# Patient Record
Sex: MALE | Race: WHITE | Employment: UNEMPLOYED | URBAN - METROPOLITAN AREA
[De-identification: names, ages, dates, MRNs, and addresses within clinical notes are randomized per-mention and may not be internally consistent; named-entity substitution may affect disease eponyms.]

---

## 2017-01-10 ENCOUNTER — HOSPITAL ENCOUNTER (OUTPATIENT)
Dept: CT IMAGING | Age: 52
Discharge: HOME | End: 2017-01-10
Attending: INTERNAL MEDICINE | Admitting: INTERNAL MEDICINE

## 2017-01-10 DIAGNOSIS — C34.31 MALIGNANT NEOPLASM OF LOWER LOBE OF RIGHT LUNG (HCC): ICD-10-CM

## 2017-01-10 DIAGNOSIS — R59.0 MEDIASTINAL ADENOPATHY: ICD-10-CM

## 2017-01-29 PROBLEM — Z85.118 ENCOUNTER FOR FOLLOW-UP SURVEILLANCE OF LUNG CANCER: Status: ACTIVE | Noted: 2017-01-29

## 2017-01-29 PROBLEM — Z08 ENCOUNTER FOR FOLLOW-UP SURVEILLANCE OF LUNG CANCER: Status: ACTIVE | Noted: 2017-01-29

## 2017-01-29 PROBLEM — Z08 ENCOUNTER FOR FOLLOW-UP EXAMINATION AFTER COMPLETED TREATMENT FOR MALIGNANT NEOPLASM: Status: ACTIVE | Noted: 2017-01-29

## 2017-01-30 RX ORDER — TIOTROPIUM BROMIDE AND OLODATEROL 3.124; 2.736 UG/1; UG/1
SPRAY, METERED RESPIRATORY (INHALATION)
Qty: 1 INHALER | Refills: 2 | Status: SHIPPED | OUTPATIENT
Start: 2017-01-30 | End: 2017-04-29 | Stop reason: SDUPTHER

## 2017-04-25 ENCOUNTER — HOSPITAL ENCOUNTER (OUTPATIENT)
Dept: CT IMAGING | Age: 52
Discharge: HOME | End: 2017-04-25
Attending: INTERNAL MEDICINE | Admitting: INTERNAL MEDICINE

## 2017-04-25 DIAGNOSIS — Z08 ENCOUNTER FOR FOLLOW-UP EXAMINATION AFTER COMPLETED TREATMENT FOR MALIGNANT NEOPLASM: ICD-10-CM

## 2017-04-25 DIAGNOSIS — Z85.118 ENCOUNTER FOR FOLLOW-UP SURVEILLANCE OF LUNG CANCER: ICD-10-CM

## 2017-04-25 DIAGNOSIS — Z85.118 PERSONAL HISTORY OF LUNG CANCER: ICD-10-CM

## 2017-04-25 DIAGNOSIS — Z85.118 PERSONAL HISTORY OF OTHER MALIGNANT NEOPLASM OF BRONCHUS AND LUNG: ICD-10-CM

## 2017-04-25 DIAGNOSIS — Z08 ENCOUNTER FOR FOLLOW-UP SURVEILLANCE OF LUNG CANCER: ICD-10-CM

## 2017-05-01 RX ORDER — TIOTROPIUM BROMIDE AND OLODATEROL 3.124; 2.736 UG/1; UG/1
SPRAY, METERED RESPIRATORY (INHALATION)
Qty: 1 INHALER | Refills: 3 | Status: SHIPPED | OUTPATIENT
Start: 2017-05-01 | End: 2017-09-19 | Stop reason: SDUPTHER

## 2017-08-16 ENCOUNTER — OFFICE VISIT (OUTPATIENT)
Dept: PRIMARY CARE CLINIC | Age: 52
End: 2017-08-16

## 2017-08-16 VITALS
DIASTOLIC BLOOD PRESSURE: 70 MMHG | TEMPERATURE: 97.6 F | SYSTOLIC BLOOD PRESSURE: 120 MMHG | HEART RATE: 120 BPM | BODY MASS INDEX: 21.5 KG/M2 | WEIGHT: 137 LBS | HEIGHT: 67 IN | OXYGEN SATURATION: 98 %

## 2017-08-16 DIAGNOSIS — Z11.59 NEED FOR HEPATITIS C SCREENING TEST: ICD-10-CM

## 2017-08-16 DIAGNOSIS — K40.90 RIGHT INGUINAL HERNIA: Primary | ICD-10-CM

## 2017-08-16 DIAGNOSIS — Z13.220 SCREENING FOR HYPERLIPIDEMIA: ICD-10-CM

## 2017-08-16 DIAGNOSIS — Z11.4 SCREENING FOR HIV WITHOUT PRESENCE OF RISK FACTORS: ICD-10-CM

## 2017-08-16 LAB
CHOLESTEROL, TOTAL: 191 MG/DL (ref 0–199)
HDLC SERPL-MCNC: 53 MG/DL (ref 40–60)
LDL CHOLESTEROL CALCULATED: 115 MG/DL
TRIGL SERPL-MCNC: 113 MG/DL (ref 0–150)
VLDLC SERPL CALC-MCNC: 23 MG/DL

## 2017-08-16 PROCEDURE — 36415 COLL VENOUS BLD VENIPUNCTURE: CPT | Performed by: NURSE PRACTITIONER

## 2017-08-16 PROCEDURE — 99202 OFFICE O/P NEW SF 15 MIN: CPT | Performed by: NURSE PRACTITIONER

## 2017-08-16 RX ORDER — QUETIAPINE FUMARATE 100 MG/1
200 TABLET, FILM COATED ORAL NIGHTLY
COMMUNITY
Start: 2017-08-15

## 2017-08-16 ASSESSMENT — ENCOUNTER SYMPTOMS
HEARTBURN: 0
HEMOPTYSIS: 0
VOMITING: 0
DIARRHEA: 0
SPUTUM PRODUCTION: 0
CONSTIPATION: 0
SHORTNESS OF BREATH: 1
BACK PAIN: 0
COUGH: 1
NAUSEA: 0
ABDOMINAL PAIN: 0
BLOOD IN STOOL: 0

## 2017-08-17 LAB
HEPATITIS C ANTIBODY INTERPRETATION: REACTIVE
HIV-1 AND HIV-2 ANTIBODIES: NORMAL

## 2017-08-22 DIAGNOSIS — R76.8 HEPATITIS C ANTIBODY TEST POSITIVE: Primary | ICD-10-CM

## 2017-08-30 ENCOUNTER — INITIAL CONSULT (OUTPATIENT)
Dept: SURGERY | Age: 52
End: 2017-08-30

## 2017-08-30 VITALS
WEIGHT: 138 LBS | SYSTOLIC BLOOD PRESSURE: 112 MMHG | HEART RATE: 83 BPM | HEIGHT: 67 IN | BODY MASS INDEX: 21.66 KG/M2 | DIASTOLIC BLOOD PRESSURE: 73 MMHG

## 2017-08-30 DIAGNOSIS — K40.90 INGUINAL HERNIA OF RIGHT SIDE WITHOUT OBSTRUCTION OR GANGRENE: Primary | ICD-10-CM

## 2017-08-30 DIAGNOSIS — Z72.0 TOBACCO USE: ICD-10-CM

## 2017-08-30 PROCEDURE — 99243 OFF/OP CNSLTJ NEW/EST LOW 30: CPT | Performed by: SURGERY

## 2017-08-30 ASSESSMENT — ENCOUNTER SYMPTOMS
SHORTNESS OF BREATH: 0
GASTROINTESTINAL NEGATIVE: 1
COUGH: 1
VOMITING: 0
NAUSEA: 0
WHEEZING: 0

## 2017-08-31 ENCOUNTER — TELEPHONE (OUTPATIENT)
Dept: PULMONOLOGY | Age: 52
End: 2017-08-31

## 2017-08-31 ENCOUNTER — HOSPITAL ENCOUNTER (OUTPATIENT)
Dept: NUCLEAR MEDICINE | Age: 52
Discharge: HOME | End: 2017-08-31
Admitting: INTERNAL MEDICINE

## 2017-08-31 DIAGNOSIS — M25.551 RIGHT HIP PAIN: ICD-10-CM

## 2017-08-31 DIAGNOSIS — C34.31 MALIGNANT NEOPLASM OF LOWER LOBE OF RIGHT LUNG (HCC): ICD-10-CM

## 2017-08-31 DIAGNOSIS — C34.31 MALIGNANT NEOPLASM OF LOWER LOBE, RIGHT BRONCHUS OR LUNG (HCC): ICD-10-CM

## 2017-08-31 RX ORDER — TC 99M MEDRONATE 20 MG/10ML
25 INJECTION, POWDER, LYOPHILIZED, FOR SOLUTION INTRAVENOUS
Status: COMPLETED | OUTPATIENT
Start: 2017-08-31 | End: 2017-08-31

## 2017-08-31 RX ORDER — ALBUTEROL SULFATE 90 UG/1
1 AEROSOL, METERED RESPIRATORY (INHALATION) EVERY 4 HOURS PRN
Qty: 1 INHALER | Refills: 0 | Status: SHIPPED | OUTPATIENT
Start: 2017-08-31 | End: 2017-09-11 | Stop reason: SDUPTHER

## 2017-08-31 RX ADMIN — TC 99M MEDRONATE 25 MILLICURIE: 20 INJECTION, POWDER, LYOPHILIZED, FOR SOLUTION INTRAVENOUS at 08:14

## 2017-09-12 RX ORDER — TIOTROPIUM BROMIDE AND OLODATEROL 3.124; 2.736 UG/1; UG/1
SPRAY, METERED RESPIRATORY (INHALATION)
Qty: 1 INHALER | Refills: 0 | Status: SHIPPED | OUTPATIENT
Start: 2017-09-12 | End: 2017-09-19 | Stop reason: SDUPTHER

## 2017-09-14 ENCOUNTER — TELEPHONE (OUTPATIENT)
Dept: SURGERY | Age: 52
End: 2017-09-14

## 2017-09-18 ENCOUNTER — TELEPHONE (OUTPATIENT)
Dept: SURGERY | Age: 52
End: 2017-09-18

## 2017-09-19 ENCOUNTER — PAT TELEPHONE (OUTPATIENT)
Dept: PREADMISSION TESTING | Age: 52
End: 2017-09-19

## 2017-09-19 ENCOUNTER — OFFICE VISIT (OUTPATIENT)
Dept: PULMONOLOGY | Age: 52
End: 2017-09-19

## 2017-09-19 VITALS
DIASTOLIC BLOOD PRESSURE: 68 MMHG | RESPIRATION RATE: 20 BRPM | HEIGHT: 67 IN | SYSTOLIC BLOOD PRESSURE: 104 MMHG | HEART RATE: 69 BPM | BODY MASS INDEX: 22.16 KG/M2 | OXYGEN SATURATION: 97 % | TEMPERATURE: 98.2 F | WEIGHT: 141.2 LBS

## 2017-09-19 VITALS — BODY MASS INDEX: 22.66 KG/M2 | HEIGHT: 66 IN | WEIGHT: 141 LBS

## 2017-09-19 DIAGNOSIS — Z23 NEED FOR INFLUENZA VACCINATION: ICD-10-CM

## 2017-09-19 DIAGNOSIS — Z72.0 TOBACCO ABUSE: ICD-10-CM

## 2017-09-19 DIAGNOSIS — J43.1 PANLOBULAR EMPHYSEMA (HCC): Primary | ICD-10-CM

## 2017-09-19 DIAGNOSIS — C34.31 MALIGNANT NEOPLASM OF LOWER LOBE OF RIGHT LUNG (HCC): ICD-10-CM

## 2017-09-19 PROCEDURE — 90688 IIV4 VACCINE SPLT 0.5 ML IM: CPT | Performed by: NURSE PRACTITIONER

## 2017-09-19 PROCEDURE — 99214 OFFICE O/P EST MOD 30 MIN: CPT | Performed by: NURSE PRACTITIONER

## 2017-09-19 PROCEDURE — 90471 IMMUNIZATION ADMIN: CPT | Performed by: NURSE PRACTITIONER

## 2017-09-22 ENCOUNTER — HOSPITAL ENCOUNTER (OUTPATIENT)
Dept: SURGERY | Age: 52
Discharge: HOME | End: 2017-09-22
Attending: SURGERY | Admitting: SURGERY

## 2017-09-22 VITALS
TEMPERATURE: 97.6 F | RESPIRATION RATE: 16 BRPM | HEART RATE: 76 BPM | HEIGHT: 66 IN | WEIGHT: 138.67 LBS | BODY MASS INDEX: 22.29 KG/M2 | OXYGEN SATURATION: 97 % | DIASTOLIC BLOOD PRESSURE: 74 MMHG | SYSTOLIC BLOOD PRESSURE: 113 MMHG

## 2017-09-22 PROCEDURE — 49505 PRP I/HERN INIT REDUC >5 YR: CPT | Performed by: SURGERY

## 2017-09-22 RX ORDER — NAPROXEN 500 MG/1
500 TABLET ORAL 2 TIMES DAILY WITH MEALS
Qty: 14 TABLET | Refills: 0 | Status: SHIPPED | OUTPATIENT
Start: 2017-09-22 | End: 2017-09-29

## 2017-09-22 RX ORDER — SODIUM CHLORIDE 9 MG/ML
INJECTION, SOLUTION INTRAVENOUS CONTINUOUS
Status: DISCONTINUED | OUTPATIENT
Start: 2017-09-22 | End: 2017-09-23 | Stop reason: HOSPADM

## 2017-09-22 RX ORDER — OXYCODONE HYDROCHLORIDE AND ACETAMINOPHEN 5; 325 MG/1; MG/1
1 TABLET ORAL PRN
Status: ACTIVE | OUTPATIENT
Start: 2017-09-22 | End: 2017-09-22

## 2017-09-22 RX ORDER — SODIUM CHLORIDE 0.9 % (FLUSH) 0.9 %
10 SYRINGE (ML) INJECTION PRN
Status: DISCONTINUED | OUTPATIENT
Start: 2017-09-22 | End: 2017-09-23 | Stop reason: HOSPADM

## 2017-09-22 RX ORDER — MORPHINE SULFATE 2 MG/ML
2 INJECTION, SOLUTION INTRAMUSCULAR; INTRAVENOUS EVERY 5 MIN PRN
Status: DISCONTINUED | OUTPATIENT
Start: 2017-09-22 | End: 2017-09-23 | Stop reason: HOSPADM

## 2017-09-22 RX ORDER — FENTANYL CITRATE 50 UG/ML
25 INJECTION, SOLUTION INTRAMUSCULAR; INTRAVENOUS EVERY 5 MIN PRN
Status: DISCONTINUED | OUTPATIENT
Start: 2017-09-22 | End: 2017-09-23 | Stop reason: HOSPADM

## 2017-09-22 RX ORDER — MEPERIDINE HYDROCHLORIDE 25 MG/ML
12.5 INJECTION INTRAMUSCULAR; INTRAVENOUS; SUBCUTANEOUS EVERY 5 MIN PRN
Status: DISCONTINUED | OUTPATIENT
Start: 2017-09-22 | End: 2017-09-23 | Stop reason: HOSPADM

## 2017-09-22 RX ORDER — MORPHINE SULFATE 2 MG/ML
1 INJECTION, SOLUTION INTRAMUSCULAR; INTRAVENOUS EVERY 5 MIN PRN
Status: DISCONTINUED | OUTPATIENT
Start: 2017-09-22 | End: 2017-09-23 | Stop reason: HOSPADM

## 2017-09-22 RX ORDER — ONDANSETRON 2 MG/ML
4 INJECTION INTRAMUSCULAR; INTRAVENOUS
Status: ACTIVE | OUTPATIENT
Start: 2017-09-22 | End: 2017-09-22

## 2017-09-22 RX ORDER — FENTANYL CITRATE 50 UG/ML
50 INJECTION, SOLUTION INTRAMUSCULAR; INTRAVENOUS EVERY 5 MIN PRN
Status: DISCONTINUED | OUTPATIENT
Start: 2017-09-22 | End: 2017-09-23 | Stop reason: HOSPADM

## 2017-09-22 RX ORDER — OXYCODONE HYDROCHLORIDE AND ACETAMINOPHEN 5; 325 MG/1; MG/1
2 TABLET ORAL PRN
Status: ACTIVE | OUTPATIENT
Start: 2017-09-22 | End: 2017-09-22

## 2017-09-22 RX ORDER — SODIUM CHLORIDE 0.9 % (FLUSH) 0.9 %
10 SYRINGE (ML) INJECTION EVERY 12 HOURS SCHEDULED
Status: DISCONTINUED | OUTPATIENT
Start: 2017-09-22 | End: 2017-09-23 | Stop reason: HOSPADM

## 2017-09-22 RX ORDER — OXYCODONE HYDROCHLORIDE AND ACETAMINOPHEN 5; 325 MG/1; MG/1
1-2 TABLET ORAL EVERY 6 HOURS PRN
Qty: 20 TABLET | Refills: 0 | Status: SHIPPED | OUTPATIENT
Start: 2017-09-22

## 2017-09-22 RX ADMIN — SODIUM CHLORIDE: 9 INJECTION, SOLUTION INTRAVENOUS at 08:50

## 2017-09-22 ASSESSMENT — PAIN SCALES - GENERAL
PAINLEVEL_OUTOF10: 0

## 2017-09-22 ASSESSMENT — ENCOUNTER SYMPTOMS: SHORTNESS OF BREATH: 0

## 2017-10-05 ENCOUNTER — OFFICE VISIT (OUTPATIENT)
Dept: SURGERY | Age: 52
End: 2017-10-05

## 2017-10-05 VITALS
HEART RATE: 94 BPM | WEIGHT: 139 LBS | BODY MASS INDEX: 22.34 KG/M2 | DIASTOLIC BLOOD PRESSURE: 75 MMHG | HEIGHT: 66 IN | SYSTOLIC BLOOD PRESSURE: 107 MMHG

## 2017-10-05 DIAGNOSIS — Z72.0 TOBACCO USE: ICD-10-CM

## 2017-10-05 DIAGNOSIS — K40.90 INGUINAL HERNIA OF RIGHT SIDE WITHOUT OBSTRUCTION OR GANGRENE: Primary | ICD-10-CM

## 2017-10-05 PROCEDURE — 99024 POSTOP FOLLOW-UP VISIT: CPT | Performed by: SURGERY

## 2017-10-12 RX ORDER — ALBUTEROL SULFATE 90 UG/1
1 AEROSOL, METERED RESPIRATORY (INHALATION) EVERY 4 HOURS PRN
Qty: 1 INHALER | Refills: 5 | Status: SHIPPED | OUTPATIENT
Start: 2017-10-12 | End: 2018-05-07 | Stop reason: SDUPTHER

## 2017-12-05 ENCOUNTER — HOSPITAL ENCOUNTER (OUTPATIENT)
Dept: NUCLEAR MEDICINE | Age: 52
Discharge: HOME | End: 2017-12-05

## 2017-12-05 DIAGNOSIS — C34.11 MALIGNANT NEOPLASM OF UPPER LOBE, RIGHT BRONCHUS OR LUNG (HCC): ICD-10-CM

## 2017-12-05 RX ORDER — TC 99M MEDRONATE 20 MG/10ML
25 INJECTION, POWDER, LYOPHILIZED, FOR SOLUTION INTRAVENOUS
Status: COMPLETED | OUTPATIENT
Start: 2017-12-05 | End: 2017-12-05

## 2017-12-05 RX ADMIN — TC 99M MEDRONATE 25 MILLICURIE: 20 INJECTION, POWDER, LYOPHILIZED, FOR SOLUTION INTRAVENOUS at 09:05

## 2018-03-15 ENCOUNTER — HOSPITAL ENCOUNTER (OUTPATIENT)
Dept: CT IMAGING | Age: 53
Discharge: HOME | End: 2018-03-15
Attending: INTERNAL MEDICINE | Admitting: INTERNAL MEDICINE

## 2018-03-15 DIAGNOSIS — C34.11 CANCER OF BRONCHUS OF RIGHT UPPER LOBE (HCC): ICD-10-CM

## 2018-05-07 ENCOUNTER — TELEPHONE (OUTPATIENT)
Dept: PULMONOLOGY | Age: 53
End: 2018-05-07

## 2018-05-07 RX ORDER — ALBUTEROL SULFATE 90 UG/1
1 AEROSOL, METERED RESPIRATORY (INHALATION) EVERY 4 HOURS PRN
Qty: 1 INHALER | Refills: 2 | Status: SHIPPED | OUTPATIENT
Start: 2018-05-07

## 2018-05-14 ENCOUNTER — TELEPHONE (OUTPATIENT)
Dept: PULMONOLOGY | Age: 53
End: 2018-05-14

## 2023-04-10 ENCOUNTER — APPOINTMENT (OUTPATIENT)
Dept: GENERAL RADIOLOGY | Age: 58
DRG: 181 | End: 2023-04-10
Payer: COMMERCIAL

## 2023-04-10 ENCOUNTER — HOSPITAL ENCOUNTER (INPATIENT)
Age: 58
LOS: 4 days | Discharge: HOME | DRG: 181 | End: 2023-04-14
Attending: EMERGENCY MEDICINE | Admitting: STUDENT IN AN ORGANIZED HEALTH CARE EDUCATION/TRAINING PROGRAM
Payer: COMMERCIAL

## 2023-04-10 ENCOUNTER — APPOINTMENT (OUTPATIENT)
Dept: CT IMAGING | Age: 58
DRG: 181 | End: 2023-04-10
Payer: COMMERCIAL

## 2023-04-10 DIAGNOSIS — R77.8 ELEVATED TROPONIN: ICD-10-CM

## 2023-04-10 DIAGNOSIS — R07.9 CHEST PAIN, UNSPECIFIED TYPE: Primary | ICD-10-CM

## 2023-04-10 DIAGNOSIS — J18.1 LUNG CONSOLIDATION (HCC): ICD-10-CM

## 2023-04-10 PROBLEM — I21.4 NSTEMI (NON-ST ELEVATED MYOCARDIAL INFARCTION) (HCC): Status: ACTIVE | Noted: 2023-04-10

## 2023-04-10 LAB
ALBUMIN SERPL-MCNC: 2.6 G/DL (ref 3.4–5)
ALBUMIN SERPL-MCNC: 3.2 G/DL (ref 3.4–5)
ALBUMIN/GLOB SERPL: 1.1 {RATIO} (ref 1.1–2.2)
ALP SERPL-CCNC: 79 U/L (ref 40–129)
ALT SERPL-CCNC: 12 U/L (ref 10–40)
ANION GAP SERPL CALCULATED.3IONS-SCNC: 10 MMOL/L (ref 3–16)
ANION GAP SERPL CALCULATED.3IONS-SCNC: 11 MMOL/L (ref 3–16)
ANION GAP SERPL CALCULATED.3IONS-SCNC: 8 MMOL/L (ref 3–16)
ANTI-XA UNFRAC HEPARIN: 0.26 IU/ML (ref 0.3–0.7)
ANTI-XA UNFRAC HEPARIN: 0.43 IU/ML (ref 0.3–0.7)
ANTI-XA UNFRAC HEPARIN: 0.46 IU/ML (ref 0.3–0.7)
APTT BLD: 26.8 SEC (ref 22.7–35.9)
AST SERPL-CCNC: 23 U/L (ref 15–37)
BASOPHILS # BLD: 0 K/UL (ref 0–0.2)
BASOPHILS # BLD: 0.1 K/UL (ref 0–0.2)
BASOPHILS NFR BLD: 0.7 %
BASOPHILS NFR BLD: 1 %
BILIRUB SERPL-MCNC: <0.2 MG/DL (ref 0–1)
BUN SERPL-MCNC: 11 MG/DL (ref 7–20)
BUN SERPL-MCNC: 12 MG/DL (ref 7–20)
BUN SERPL-MCNC: 14 MG/DL (ref 7–20)
CALCIUM SERPL-MCNC: 7.9 MG/DL (ref 8.3–10.6)
CALCIUM SERPL-MCNC: 7.9 MG/DL (ref 8.3–10.6)
CALCIUM SERPL-MCNC: 9 MG/DL (ref 8.3–10.6)
CHLORIDE SERPL-SCNC: 101 MMOL/L (ref 99–110)
CHLORIDE SERPL-SCNC: 104 MMOL/L (ref 99–110)
CHLORIDE SERPL-SCNC: 99 MMOL/L (ref 99–110)
CO2 SERPL-SCNC: 21 MMOL/L (ref 21–32)
CO2 SERPL-SCNC: 21 MMOL/L (ref 21–32)
CO2 SERPL-SCNC: 29 MMOL/L (ref 21–32)
CREAT SERPL-MCNC: 0.8 MG/DL (ref 0.9–1.3)
CREAT SERPL-MCNC: 0.9 MG/DL (ref 0.9–1.3)
CREAT SERPL-MCNC: 1 MG/DL (ref 0.9–1.3)
DEPRECATED RDW RBC AUTO: 15.9 % (ref 12.4–15.4)
DEPRECATED RDW RBC AUTO: 16.1 % (ref 12.4–15.4)
DEPRECATED RDW RBC AUTO: 16.7 % (ref 12.4–15.4)
EKG ATRIAL RATE: 67 BPM
EKG ATRIAL RATE: 74 BPM
EKG ATRIAL RATE: 81 BPM
EKG DIAGNOSIS: NORMAL
EKG P AXIS: 69 DEGREES
EKG P AXIS: 70 DEGREES
EKG P AXIS: 70 DEGREES
EKG P-R INTERVAL: 162 MS
EKG P-R INTERVAL: 166 MS
EKG P-R INTERVAL: 170 MS
EKG Q-T INTERVAL: 362 MS
EKG Q-T INTERVAL: 374 MS
EKG Q-T INTERVAL: 378 MS
EKG QRS DURATION: 68 MS
EKG QRS DURATION: 70 MS
EKG QRS DURATION: 72 MS
EKG QTC CALCULATION (BAZETT): 395 MS
EKG QTC CALCULATION (BAZETT): 419 MS
EKG QTC CALCULATION (BAZETT): 420 MS
EKG R AXIS: 19 DEGREES
EKG R AXIS: 46 DEGREES
EKG R AXIS: 8 DEGREES
EKG T AXIS: 63 DEGREES
EKG T AXIS: 71 DEGREES
EKG T AXIS: 76 DEGREES
EKG VENTRICULAR RATE: 67 BPM
EKG VENTRICULAR RATE: 74 BPM
EKG VENTRICULAR RATE: 81 BPM
EOSINOPHIL # BLD: 0.1 K/UL (ref 0–0.6)
EOSINOPHIL # BLD: 0.1 K/UL (ref 0–0.6)
EOSINOPHIL NFR BLD: 1.5 %
EOSINOPHIL NFR BLD: 2 %
GFR SERPLBLD CREATININE-BSD FMLA CKD-EPI: >60 ML/MIN/{1.73_M2}
GLUCOSE SERPL-MCNC: 103 MG/DL (ref 70–99)
GLUCOSE SERPL-MCNC: 118 MG/DL (ref 70–99)
GLUCOSE SERPL-MCNC: 88 MG/DL (ref 70–99)
HCT VFR BLD AUTO: 34.1 % (ref 40.5–52.5)
HCT VFR BLD AUTO: 34.1 % (ref 40.5–52.5)
HCT VFR BLD AUTO: 41.3 % (ref 40.5–52.5)
HGB BLD-MCNC: 11.3 G/DL (ref 13.5–17.5)
HGB BLD-MCNC: 11.5 G/DL (ref 13.5–17.5)
HGB BLD-MCNC: 13.9 G/DL (ref 13.5–17.5)
LIPASE SERPL-CCNC: 41 U/L (ref 13–60)
LYMPHOCYTES # BLD: 0.5 K/UL (ref 1–5.1)
LYMPHOCYTES # BLD: 0.9 K/UL (ref 1–5.1)
LYMPHOCYTES NFR BLD: 10.5 %
LYMPHOCYTES NFR BLD: 15 %
MAGNESIUM SERPL-MCNC: 1.8 MG/DL (ref 1.8–2.4)
MCH RBC QN AUTO: 29.2 PG (ref 26–34)
MCH RBC QN AUTO: 29.9 PG (ref 26–34)
MCH RBC QN AUTO: 30.1 PG (ref 26–34)
MCHC RBC AUTO-ENTMCNC: 33.2 G/DL (ref 31–36)
MCHC RBC AUTO-ENTMCNC: 33.7 G/DL (ref 31–36)
MCHC RBC AUTO-ENTMCNC: 33.8 G/DL (ref 31–36)
MCV RBC AUTO: 87.8 FL (ref 80–100)
MCV RBC AUTO: 88.6 FL (ref 80–100)
MCV RBC AUTO: 88.9 FL (ref 80–100)
MONOCYTES # BLD: 0.4 K/UL (ref 0–1.3)
MONOCYTES # BLD: 0.6 K/UL (ref 0–1.3)
MONOCYTES NFR BLD: 10.7 %
MONOCYTES NFR BLD: 8.3 %
NEUTROPHILS # BLD: 3.7 K/UL (ref 1.7–7.7)
NEUTROPHILS # BLD: 4.3 K/UL (ref 1.7–7.7)
NEUTROPHILS NFR BLD: 71.3 %
NEUTROPHILS NFR BLD: 79 %
NT-PROBNP SERPL-MCNC: 377 PG/ML (ref 0–124)
PHOSPHATE SERPL-MCNC: 3.2 MG/DL (ref 2.5–4.9)
PLATELET # BLD AUTO: 210 K/UL (ref 135–450)
PLATELET # BLD AUTO: 211 K/UL (ref 135–450)
PLATELET # BLD AUTO: 296 K/UL (ref 135–450)
PMV BLD AUTO: 7.8 FL (ref 5–10.5)
PMV BLD AUTO: 7.9 FL (ref 5–10.5)
PMV BLD AUTO: 8 FL (ref 5–10.5)
POTASSIUM SERPL-SCNC: 4 MMOL/L (ref 3.5–5.1)
POTASSIUM SERPL-SCNC: 4.4 MMOL/L (ref 3.5–5.1)
POTASSIUM SERPL-SCNC: 4.6 MMOL/L (ref 3.5–5.1)
PROT SERPL-MCNC: 6.2 G/DL (ref 6.4–8.2)
RBC # BLD AUTO: 3.84 M/UL (ref 4.2–5.9)
RBC # BLD AUTO: 3.89 M/UL (ref 4.2–5.9)
RBC # BLD AUTO: 4.66 M/UL (ref 4.2–5.9)
SODIUM SERPL-SCNC: 130 MMOL/L (ref 136–145)
SODIUM SERPL-SCNC: 136 MMOL/L (ref 136–145)
SODIUM SERPL-SCNC: 138 MMOL/L (ref 136–145)
TROPONIN T SERPL-MCNC: 0.16 NG/ML
TROPONIN T SERPL-MCNC: 0.42 NG/ML
WBC # BLD AUTO: 3.7 K/UL (ref 4–11)
WBC # BLD AUTO: 4.7 K/UL (ref 4–11)
WBC # BLD AUTO: 6 K/UL (ref 4–11)

## 2023-04-10 PROCEDURE — 85025 COMPLETE CBC W/AUTO DIFF WBC: CPT

## 2023-04-10 PROCEDURE — 71045 X-RAY EXAM CHEST 1 VIEW: CPT

## 2023-04-10 PROCEDURE — 83880 ASSAY OF NATRIURETIC PEPTIDE: CPT

## 2023-04-10 PROCEDURE — 36415 COLL VENOUS BLD VENIPUNCTURE: CPT

## 2023-04-10 PROCEDURE — 6360000002 HC RX W HCPCS: Performed by: EMERGENCY MEDICINE

## 2023-04-10 PROCEDURE — 84484 ASSAY OF TROPONIN QUANT: CPT

## 2023-04-10 PROCEDURE — 6370000000 HC RX 637 (ALT 250 FOR IP): Performed by: FAMILY MEDICINE

## 2023-04-10 PROCEDURE — 2580000003 HC RX 258: Performed by: EMERGENCY MEDICINE

## 2023-04-10 PROCEDURE — 96368 THER/DIAG CONCURRENT INF: CPT

## 2023-04-10 PROCEDURE — 6370000000 HC RX 637 (ALT 250 FOR IP): Performed by: STUDENT IN AN ORGANIZED HEALTH CARE EDUCATION/TRAINING PROGRAM

## 2023-04-10 PROCEDURE — 96375 TX/PRO/DX INJ NEW DRUG ADDON: CPT

## 2023-04-10 PROCEDURE — 93005 ELECTROCARDIOGRAM TRACING: CPT | Performed by: EMERGENCY MEDICINE

## 2023-04-10 PROCEDURE — 96365 THER/PROPH/DIAG IV INF INIT: CPT

## 2023-04-10 PROCEDURE — 99254 IP/OBS CNSLTJ NEW/EST MOD 60: CPT | Performed by: STUDENT IN AN ORGANIZED HEALTH CARE EDUCATION/TRAINING PROGRAM

## 2023-04-10 PROCEDURE — 96366 THER/PROPH/DIAG IV INF ADDON: CPT

## 2023-04-10 PROCEDURE — 6360000002 HC RX W HCPCS: Performed by: STUDENT IN AN ORGANIZED HEALTH CARE EDUCATION/TRAINING PROGRAM

## 2023-04-10 PROCEDURE — 80053 COMPREHEN METABOLIC PANEL: CPT

## 2023-04-10 PROCEDURE — 6360000004 HC RX CONTRAST MEDICATION: Performed by: EMERGENCY MEDICINE

## 2023-04-10 PROCEDURE — 75630 X-RAY AORTA LEG ARTERIES: CPT | Performed by: STUDENT IN AN ORGANIZED HEALTH CARE EDUCATION/TRAINING PROGRAM

## 2023-04-10 PROCEDURE — 99285 EMERGENCY DEPT VISIT HI MDM: CPT

## 2023-04-10 PROCEDURE — 93010 ELECTROCARDIOGRAM REPORT: CPT | Performed by: INTERNAL MEDICINE

## 2023-04-10 PROCEDURE — 85520 HEPARIN ASSAY: CPT

## 2023-04-10 PROCEDURE — 83735 ASSAY OF MAGNESIUM: CPT

## 2023-04-10 PROCEDURE — 71260 CT THORAX DX C+: CPT | Performed by: EMERGENCY MEDICINE

## 2023-04-10 PROCEDURE — 6370000000 HC RX 637 (ALT 250 FOR IP): Performed by: EMERGENCY MEDICINE

## 2023-04-10 PROCEDURE — 1200000000 HC SEMI PRIVATE

## 2023-04-10 PROCEDURE — 83690 ASSAY OF LIPASE: CPT

## 2023-04-10 PROCEDURE — 85730 THROMBOPLASTIN TIME PARTIAL: CPT

## 2023-04-10 PROCEDURE — 85027 COMPLETE CBC AUTOMATED: CPT

## 2023-04-10 PROCEDURE — 2580000003 HC RX 258: Performed by: STUDENT IN AN ORGANIZED HEALTH CARE EDUCATION/TRAINING PROGRAM

## 2023-04-10 PROCEDURE — 96376 TX/PRO/DX INJ SAME DRUG ADON: CPT

## 2023-04-10 RX ORDER — SODIUM CHLORIDE 0.9 % (FLUSH) 0.9 %
5-40 SYRINGE (ML) INJECTION PRN
Status: DISCONTINUED | OUTPATIENT
Start: 2023-04-10 | End: 2023-04-14 | Stop reason: SDUPTHER

## 2023-04-10 RX ORDER — HEPARIN SODIUM 1000 [USP'U]/ML
60 INJECTION, SOLUTION INTRAVENOUS; SUBCUTANEOUS PRN
Status: DISCONTINUED | OUTPATIENT
Start: 2023-04-10 | End: 2023-04-10

## 2023-04-10 RX ORDER — ACETAMINOPHEN 325 MG/1
650 TABLET ORAL EVERY 6 HOURS PRN
Status: DISCONTINUED | OUTPATIENT
Start: 2023-04-10 | End: 2023-04-14 | Stop reason: HOSPADM

## 2023-04-10 RX ORDER — NITROGLYCERIN 0.4 MG/1
0.4 TABLET SUBLINGUAL EVERY 5 MIN PRN
Status: DISCONTINUED | OUTPATIENT
Start: 2023-04-10 | End: 2023-04-14 | Stop reason: HOSPADM

## 2023-04-10 RX ORDER — SODIUM CHLORIDE 0.9 % (FLUSH) 0.9 %
5-40 SYRINGE (ML) INJECTION EVERY 12 HOURS SCHEDULED
Status: DISCONTINUED | OUTPATIENT
Start: 2023-04-10 | End: 2023-04-14 | Stop reason: HOSPADM

## 2023-04-10 RX ORDER — HEPARIN SODIUM AND DEXTROSE 10000; 5 [USP'U]/100ML; G/100ML
0-4000 INJECTION INTRAVENOUS CONTINUOUS
Status: DISCONTINUED | OUTPATIENT
Start: 2023-04-10 | End: 2023-04-11

## 2023-04-10 RX ORDER — HEPARIN SODIUM 1000 [USP'U]/ML
60 INJECTION, SOLUTION INTRAVENOUS; SUBCUTANEOUS ONCE
Status: DISCONTINUED | OUTPATIENT
Start: 2023-04-10 | End: 2023-04-10

## 2023-04-10 RX ORDER — ONDANSETRON 2 MG/ML
4 INJECTION INTRAMUSCULAR; INTRAVENOUS EVERY 6 HOURS PRN
Status: DISCONTINUED | OUTPATIENT
Start: 2023-04-10 | End: 2023-04-14 | Stop reason: HOSPADM

## 2023-04-10 RX ORDER — SODIUM CHLORIDE 9 MG/ML
INJECTION, SOLUTION INTRAVENOUS PRN
Status: DISCONTINUED | OUTPATIENT
Start: 2023-04-10 | End: 2023-04-14 | Stop reason: HOSPADM

## 2023-04-10 RX ORDER — ASPIRIN 325 MG
325 TABLET ORAL ONCE
Status: COMPLETED | OUTPATIENT
Start: 2023-04-10 | End: 2023-04-10

## 2023-04-10 RX ORDER — HEPARIN SODIUM 1000 [USP'U]/ML
30 INJECTION, SOLUTION INTRAVENOUS; SUBCUTANEOUS PRN
Status: DISCONTINUED | OUTPATIENT
Start: 2023-04-10 | End: 2023-04-10

## 2023-04-10 RX ORDER — SODIUM CHLORIDE 9 MG/ML
INJECTION, SOLUTION INTRAVENOUS PRN
Status: DISCONTINUED | OUTPATIENT
Start: 2023-04-10 | End: 2023-04-14 | Stop reason: SDUPTHER

## 2023-04-10 RX ORDER — HEPARIN SODIUM 1000 [USP'U]/ML
3800 INJECTION, SOLUTION INTRAVENOUS; SUBCUTANEOUS ONCE
Status: COMPLETED | OUTPATIENT
Start: 2023-04-10 | End: 2023-04-10

## 2023-04-10 RX ORDER — NICOTINE 21 MG/24HR
1 PATCH, TRANSDERMAL 24 HOURS TRANSDERMAL DAILY
Status: DISCONTINUED | OUTPATIENT
Start: 2023-04-10 | End: 2023-04-14 | Stop reason: HOSPADM

## 2023-04-10 RX ORDER — ASPIRIN 81 MG/1
81 TABLET, CHEWABLE ORAL DAILY
Status: DISCONTINUED | OUTPATIENT
Start: 2023-04-11 | End: 2023-04-14 | Stop reason: HOSPADM

## 2023-04-10 RX ORDER — MORPHINE SULFATE 4 MG/ML
4 INJECTION, SOLUTION INTRAMUSCULAR; INTRAVENOUS ONCE
Status: COMPLETED | OUTPATIENT
Start: 2023-04-10 | End: 2023-04-10

## 2023-04-10 RX ORDER — HEPARIN SODIUM 10000 [USP'U]/100ML
5-30 INJECTION, SOLUTION INTRAVENOUS CONTINUOUS
Status: DISCONTINUED | OUTPATIENT
Start: 2023-04-10 | End: 2023-04-10

## 2023-04-10 RX ORDER — MORPHINE SULFATE 2 MG/ML
2 INJECTION, SOLUTION INTRAMUSCULAR; INTRAVENOUS EVERY 4 HOURS PRN
Status: DISCONTINUED | OUTPATIENT
Start: 2023-04-10 | End: 2023-04-14 | Stop reason: HOSPADM

## 2023-04-10 RX ORDER — 0.9 % SODIUM CHLORIDE 0.9 %
500 INTRAVENOUS SOLUTION INTRAVENOUS ONCE
Status: COMPLETED | OUTPATIENT
Start: 2023-04-10 | End: 2023-04-10

## 2023-04-10 RX ORDER — SODIUM CHLORIDE 0.9 % (FLUSH) 0.9 %
5-40 SYRINGE (ML) INJECTION PRN
Status: DISCONTINUED | OUTPATIENT
Start: 2023-04-10 | End: 2023-04-14 | Stop reason: HOSPADM

## 2023-04-10 RX ORDER — ATORVASTATIN CALCIUM 80 MG/1
80 TABLET, FILM COATED ORAL NIGHTLY
Status: DISCONTINUED | OUTPATIENT
Start: 2023-04-10 | End: 2023-04-14 | Stop reason: HOSPADM

## 2023-04-10 RX ORDER — HEPARIN SODIUM 1000 [USP'U]/ML
1900 INJECTION, SOLUTION INTRAVENOUS; SUBCUTANEOUS ONCE
Status: COMPLETED | OUTPATIENT
Start: 2023-04-10 | End: 2023-04-10

## 2023-04-10 RX ORDER — ACETAMINOPHEN 650 MG/1
650 SUPPOSITORY RECTAL EVERY 6 HOURS PRN
Status: DISCONTINUED | OUTPATIENT
Start: 2023-04-10 | End: 2023-04-14 | Stop reason: HOSPADM

## 2023-04-10 RX ORDER — ONDANSETRON 2 MG/ML
4 INJECTION INTRAMUSCULAR; INTRAVENOUS ONCE
Status: COMPLETED | OUTPATIENT
Start: 2023-04-10 | End: 2023-04-10

## 2023-04-10 RX ORDER — SODIUM CHLORIDE 0.9 % (FLUSH) 0.9 %
5-40 SYRINGE (ML) INJECTION EVERY 12 HOURS SCHEDULED
Status: DISCONTINUED | OUTPATIENT
Start: 2023-04-10 | End: 2023-04-14 | Stop reason: SDUPTHER

## 2023-04-10 RX ADMIN — ATORVASTATIN CALCIUM 80 MG: 80 TABLET, FILM COATED ORAL at 21:05

## 2023-04-10 RX ADMIN — SODIUM CHLORIDE: 9 INJECTION, SOLUTION INTRAVENOUS at 04:57

## 2023-04-10 RX ADMIN — ONDANSETRON 4 MG: 2 INJECTION INTRAMUSCULAR; INTRAVENOUS at 00:47

## 2023-04-10 RX ADMIN — TICAGRELOR 180 MG: 90 TABLET ORAL at 01:22

## 2023-04-10 RX ADMIN — SODIUM CHLORIDE 500 ML: 9 INJECTION, SOLUTION INTRAVENOUS at 01:06

## 2023-04-10 RX ADMIN — HEPARIN SODIUM 750 UNITS/HR: 10000 INJECTION, SOLUTION INTRAVENOUS at 01:21

## 2023-04-10 RX ADMIN — AZITHROMYCIN DIHYDRATE 500 MG: 500 INJECTION, POWDER, LYOPHILIZED, FOR SOLUTION INTRAVENOUS at 05:06

## 2023-04-10 RX ADMIN — CEFTRIAXONE 1000 MG: 1 INJECTION, POWDER, FOR SOLUTION INTRAMUSCULAR; INTRAVENOUS at 23:32

## 2023-04-10 RX ADMIN — HEPARIN SODIUM 1900 UNITS: 1000 INJECTION INTRAVENOUS; SUBCUTANEOUS at 14:33

## 2023-04-10 RX ADMIN — HEPARIN SODIUM 3800 UNITS: 1000 INJECTION INTRAVENOUS; SUBCUTANEOUS at 01:21

## 2023-04-10 RX ADMIN — MORPHINE SULFATE 4 MG: 4 INJECTION, SOLUTION INTRAMUSCULAR; INTRAVENOUS at 00:46

## 2023-04-10 RX ADMIN — CEFTRIAXONE 1000 MG: 1 INJECTION, POWDER, FOR SOLUTION INTRAMUSCULAR; INTRAVENOUS at 02:57

## 2023-04-10 RX ADMIN — ASPIRIN 325 MG: 325 TABLET ORAL at 00:45

## 2023-04-10 RX ADMIN — IOMEPROL INJECTION 100 ML: 714 INJECTION, SOLUTION INTRAVASCULAR at 01:54

## 2023-04-10 SDOH — ECONOMIC STABILITY: TRANSPORTATION INSECURITY
IN THE PAST 12 MONTHS, HAS THE LACK OF TRANSPORTATION KEPT YOU FROM MEDICAL APPOINTMENTS OR FROM GETTING MEDICATIONS?: NO

## 2023-04-10 SDOH — HEALTH STABILITY: PHYSICAL HEALTH: ON AVERAGE, HOW MANY DAYS PER WEEK DO YOU ENGAGE IN MODERATE TO STRENUOUS EXERCISE (LIKE A BRISK WALK)?: 0 DAYS

## 2023-04-10 SDOH — ECONOMIC STABILITY: INCOME INSECURITY: IN THE LAST 12 MONTHS, WAS THERE A TIME WHEN YOU WERE NOT ABLE TO PAY THE MORTGAGE OR RENT ON TIME?: NO

## 2023-04-10 SDOH — ECONOMIC STABILITY: FOOD INSECURITY: WITHIN THE PAST 12 MONTHS, YOU WORRIED THAT YOUR FOOD WOULD RUN OUT BEFORE YOU GOT MONEY TO BUY MORE.: NEVER TRUE

## 2023-04-10 SDOH — ECONOMIC STABILITY: TRANSPORTATION INSECURITY
IN THE PAST 12 MONTHS, HAS LACK OF TRANSPORTATION KEPT YOU FROM MEETINGS, WORK, OR FROM GETTING THINGS NEEDED FOR DAILY LIVING?: NO

## 2023-04-10 SDOH — ECONOMIC STABILITY: FOOD INSECURITY: WITHIN THE PAST 12 MONTHS, THE FOOD YOU BOUGHT JUST DIDN'T LAST AND YOU DIDN'T HAVE MONEY TO GET MORE.: NEVER TRUE

## 2023-04-10 SDOH — HEALTH STABILITY: PHYSICAL HEALTH: ON AVERAGE, HOW MANY MINUTES DO YOU ENGAGE IN EXERCISE AT THIS LEVEL?: 0 MIN

## 2023-04-10 SDOH — ECONOMIC STABILITY: HOUSING INSECURITY
IN THE LAST 12 MONTHS, WAS THERE A TIME WHEN YOU DID NOT HAVE A STEADY PLACE TO SLEEP OR SLEPT IN A SHELTER (INCLUDING NOW)?: NO

## 2023-04-10 ASSESSMENT — PAIN DESCRIPTION - ONSET
ONSET: GRADUAL
ONSET: ON-GOING

## 2023-04-10 ASSESSMENT — SOCIAL DETERMINANTS OF HEALTH (SDOH)
HOW OFTEN DO YOU ATTEND CHURCH OR RELIGIOUS SERVICES?: MORE THAN 4 TIMES PER YEAR
WITHIN THE LAST YEAR, HAVE YOU BEEN HUMILIATED OR EMOTIONALLY ABUSED IN OTHER WAYS BY YOUR PARTNER OR EX-PARTNER?: NO
WITHIN THE LAST YEAR, HAVE YOU BEEN KICKED, HIT, SLAPPED, OR OTHERWISE PHYSICALLY HURT BY YOUR PARTNER OR EX-PARTNER?: NO
HOW HARD IS IT FOR YOU TO PAY FOR THE VERY BASICS LIKE FOOD, HOUSING, MEDICAL CARE, AND HEATING?: NOT HARD AT ALL
WITHIN THE LAST YEAR, HAVE TO BEEN RAPED OR FORCED TO HAVE ANY KIND OF SEXUAL ACTIVITY BY YOUR PARTNER OR EX-PARTNER?: NO
WITHIN THE LAST YEAR, HAVE YOU BEEN AFRAID OF YOUR PARTNER OR EX-PARTNER?: NO
HOW OFTEN DO YOU ATTENT MEETINGS OF THE CLUB OR ORGANIZATION YOU BELONG TO?: MORE THAN 4 TIMES PER YEAR
HOW OFTEN DO YOU GET TOGETHER WITH FRIENDS OR RELATIVES?: MORE THAN THREE TIMES A WEEK
DO YOU BELONG TO ANY CLUBS OR ORGANIZATIONS SUCH AS CHURCH GROUPS UNIONS, FRATERNAL OR ATHLETIC GROUPS, OR SCHOOL GROUPS?: YES
IN A TYPICAL WEEK, HOW MANY TIMES DO YOU TALK ON THE PHONE WITH FAMILY, FRIENDS, OR NEIGHBORS?: MORE THAN THREE TIMES A WEEK

## 2023-04-10 ASSESSMENT — PAIN - FUNCTIONAL ASSESSMENT
PAIN_FUNCTIONAL_ASSESSMENT: 0-10
PAIN_FUNCTIONAL_ASSESSMENT: ACTIVITIES ARE NOT PREVENTED
PAIN_FUNCTIONAL_ASSESSMENT: NONE - DENIES PAIN

## 2023-04-10 ASSESSMENT — PAIN SCALES - GENERAL
PAINLEVEL_OUTOF10: 8
PAINLEVEL_OUTOF10: 4
PAINLEVEL_OUTOF10: 8
PAINLEVEL_OUTOF10: 0
PAINLEVEL_OUTOF10: 0

## 2023-04-10 ASSESSMENT — PAIN DESCRIPTION - FREQUENCY
FREQUENCY: CONTINUOUS
FREQUENCY: CONTINUOUS

## 2023-04-10 ASSESSMENT — PAIN DESCRIPTION - DESCRIPTORS
DESCRIPTORS: DISCOMFORT
DESCRIPTORS: DISCOMFORT;SHARP

## 2023-04-10 ASSESSMENT — PAIN DESCRIPTION - LOCATION
LOCATION: CHEST;NECK
LOCATION: CHEST

## 2023-04-10 ASSESSMENT — LIFESTYLE VARIABLES
HOW OFTEN DO YOU HAVE A DRINK CONTAINING ALCOHOL: NEVER
HOW MANY STANDARD DRINKS CONTAINING ALCOHOL DO YOU HAVE ON A TYPICAL DAY: PATIENT DOES NOT DRINK

## 2023-04-10 ASSESSMENT — PAIN DESCRIPTION - PAIN TYPE: TYPE: ACUTE PAIN

## 2023-04-10 ASSESSMENT — PAIN DESCRIPTION - ORIENTATION
ORIENTATION: LEFT
ORIENTATION: LEFT

## 2023-04-10 NOTE — PLAN OF CARE
Problem: Discharge Planning  Goal: Discharge to home or other facility with appropriate resources  Outcome: Progressing  Flowsheets (Taken 4/10/2023 4966)  Discharge to home or other facility with appropriate resources:   Identify barriers to discharge with patient and caregiver   Arrange for needed discharge resources and transportation as appropriate   Identify discharge learning needs (meds, wound care, etc)   Arrange for interpreters to assist at discharge as needed   Refer to discharge planning if patient needs post-hospital services based on physician order or complex needs related to functional status, cognitive ability or social support system     Problem: Safety - Adult  Goal: Free from fall injury  Outcome: Progressing     Problem: ABCDS Injury Assessment  Goal: Absence of physical injury  Outcome: Progressing

## 2023-04-10 NOTE — CARE COORDINATION
Case Management Assessment  Initial Evaluation    Date/Time of Evaluation: 4/10/2023 3:41 PM  Assessment Completed by: Sung Hodge RN    If patient is discharged prior to next notation, then this note serves as note for discharge by case management. Patient Name: Clayton Deshpande                     YOB: 1965  Diagnosis: NSTEMI (non-ST elevated myocardial infarction) [facility]) [I21.4]                     Date / Time: 4/10/2023 12:20 AM    Patient Admission Status: Inpatient   Readmission Risk (Low < 19, Mod (19-27), High > 27): Readmission Risk Score: 10.8    Current PCP: No primary care provider on file. PCP verified by CM? (P) Yes (No PCP. PCP list given to patient.)    Chart Reviewed: Yes      History Provided by: Patient  Patient Orientation: Alert and Oriented    Patient Cognition: Alert    Hospitalization in the last 30 days (Readmission):  No    If yes, Readmission Assessment in CM Navigator will be completed.     Advance Directives:      Code Status: Full Code   Patient's Primary Decision Maker is: Legal Next of Kin    Primary Decision Maker: Latisha Quesada - Spouse - [phone]    Discharge Planning:    Patient lives with: (P) Spouse/Significant Other Type of Home: Apartment  Primary Care Giver: Self  Patient Support Systems include: Spouse/Significant Other   Current Financial resources: (P) Medicaid  Current community resources: (P) None  Current services prior to admission: (P) None            Current DME:              Type of Home Care services:  (P) None    ADLS  Prior functional level: (P) Independent in ADLs/IADLs  Current functional level: (P) Independent in ADLs/IADLs    PT AM-PAC:   /24  OT AM-PAC:   /24    Family can provide assistance at DC: (P) Yes  Would you like Case Management to discuss the discharge plan with any other family members/significant others, and if so, who? (P) Yes (wife)  Plans to Return to Present Housing: (P) Yes  Other Identified Issues/Barriers to

## 2023-04-10 NOTE — H&P
136   K 4.6 4.4    104   CO2 29 21   PHOS  --  3.2   BUN 14 12   CREATININE 1.0 0.9     LFT'S  Recent Labs     04/10/23  0035   AST 23   ALT 12   BILITOT <0.2   ALKPHOS 79     COAG  No results for input(s): INR in the last 72 hours. CARDIAC ENZYMES  Recent Labs     04/10/23  0035 04/10/23  0600   TROPONINI 0.16* 0.42*       U/A:    Lab Results   Component Value Date/Time    COLORU DARK YELLOW 08/08/2014 09:44 AM    WBCUA 0-2 08/08/2014 09:44 AM    RBCUA 0-2 08/08/2014 09:44 AM    BACTERIA 1+ 08/08/2014 09:44 AM    CLARITYU SL CLOUDY 08/08/2014 09:44 AM    SPECGRAV >=1.030 08/08/2014 09:44 AM    LEUKOCYTESUR Negative 08/08/2014 09:44 AM    BLOODU SMALL 08/08/2014 09:44 AM    GLUCOSEU Negative 08/08/2014 09:44 AM    AMORPHOUS 2+ 08/08/2014 09:44 AM       ABG  No results found for: EBI6ODY, BEART, J3EVJLUO, PHART, THGBART, RDO1WFF, PO2ART, NVE7POR        Active Hospital Problems    Diagnosis Date Noted    NSTEMI (non-ST elevated myocardial infarction) ([facility] [I21.4] 04/10/2023    Pneumonia [J18.9] 08/08/2014         PHYSICIANS CERTIFICATION:    I certify that Rowan Melendez is expected to be hospitalized for more than 2 midnights based on the following assessment and plan:      ASSESSMENT/PLAN:    NSTEMI  Unstable angina  - troponin elevated trend  - heparin drip  - consult cardiology for cath  - NPO    H/o COPD and lung cancer 2016  - on no chronic home medications    DVT Prophylaxis: heparin drip  Diet: Diet NPO  Code Status: Full Code  PT/OT Eval Status: n/a    Dispo - Cornelius Fleming MD    Thank you No primary care provider on file. for the opportunity to be involved in this patient's care. If you have any questions or concerns please feel free to contact me at [phone].

## 2023-04-10 NOTE — ED PROVIDER NOTES
History:   Diagnosis Date    Active smoker     COPD (chronic obstructive pulmonary disease) ([facility]     Hepatitis C     [facility]ung cancer ([facility] 2016    chemo and radiation         Records Reviewed (External and source): Reviewed patient's previous admissions as well as history of malignancy of the lung. Disposition Considerations (include 1 Tests not done, Admit vs D/C, Shared Decision Making, Pt Expectation of Test or Tx.): Patient has an NSTEMI and require admission to the hospital for further medical management evaluation. This was discussed with patient is amenable to treatment plan. I am the Primary Clinician of Record. FINAL IMPRESSION      1. Chest pain, unspecified type    2. Elevated troponin    3. Lung consolidation ([facility]          DISPOSITION/PLAN     DISPOSITION        PATIENT REFERRED TO:  No follow-up provider specified.     DISCHARGE MEDICATIONS:  New Prescriptions    No medications on file       DISCONTINUED MEDICATIONS:  Discontinued Medications    No medications on file              (Please note that portions of this note were completed with a voice recognition program.  Efforts were made to edit the dictations but occasionally words are mis-transcribed.)    Wilton Chin MD (electronically signed)            Wilton Chin MD  04/11/23 4026

## 2023-04-10 NOTE — ED NOTES
[address] arrived to transport patient to [facility] for admission. Report given to transport crew, all questions answered, paperwork given to transport crew, patient agreeable to transfer.       Laurie Domingo RN  04/10/23 9160

## 2023-04-11 ENCOUNTER — APPOINTMENT (OUTPATIENT)
Dept: CARDIAC CATH/INVASIVE PROCEDURES | Age: 58
DRG: 181 | End: 2023-04-11
Payer: COMMERCIAL

## 2023-04-11 PROBLEM — Z85.118 HISTORY OF LUNG CANCER IN ADULTHOOD: Status: ACTIVE | Noted: 2023-04-11

## 2023-04-11 LAB
ALBUMIN SERPL-MCNC: 2.3 G/DL (ref 3.4–5)
ANION GAP SERPL CALCULATED.3IONS-SCNC: 9 MMOL/L (ref 3–16)
ANTI-XA UNFRAC HEPARIN: 0.33 IU/ML (ref 0.3–0.7)
ANTI-XA UNFRAC HEPARIN: <0.1 IU/ML (ref 0.3–0.7)
BASOPHILS # BLD: 0 K/UL (ref 0–0.2)
BASOPHILS NFR BLD: 0.8 %
BUN SERPL-MCNC: 9 MG/DL (ref 7–20)
CALCIUM SERPL-MCNC: 7.8 MG/DL (ref 8.3–10.6)
CHLORIDE SERPL-SCNC: 100 MMOL/L (ref 99–110)
CO2 SERPL-SCNC: 23 MMOL/L (ref 21–32)
CREAT SERPL-MCNC: 0.8 MG/DL (ref 0.9–1.3)
DEPRECATED RDW RBC AUTO: 16.1 % (ref 12.4–15.4)
EOSINOPHIL # BLD: 0 K/UL (ref 0–0.6)
EOSINOPHIL NFR BLD: 0.6 %
GFR SERPLBLD CREATININE-BSD FMLA CKD-EPI: >60 ML/MIN/{1.73_M2}
GLUCOSE SERPL-MCNC: 89 MG/DL (ref 70–99)
HCT VFR BLD AUTO: 32.7 % (ref 40.5–52.5)
HGB BLD-MCNC: 10.9 G/DL (ref 13.5–17.5)
LYMPHOCYTES # BLD: 0.3 K/UL (ref 1–5.1)
LYMPHOCYTES NFR BLD: 9.2 %
MAGNESIUM SERPL-MCNC: 1.7 MG/DL (ref 1.8–2.4)
MCH RBC QN AUTO: 29.7 PG (ref 26–34)
MCHC RBC AUTO-ENTMCNC: 33.2 G/DL (ref 31–36)
MCV RBC AUTO: 89.3 FL (ref 80–100)
MONOCYTES # BLD: 0.5 K/UL (ref 0–1.3)
MONOCYTES NFR BLD: 13.7 %
NEUTROPHILS # BLD: 2.7 K/UL (ref 1.7–7.7)
NEUTROPHILS NFR BLD: 75.7 %
PHOSPHATE SERPL-MCNC: 3.1 MG/DL (ref 2.5–4.9)
PLATELET # BLD AUTO: 204 K/UL (ref 135–450)
PMV BLD AUTO: 8.4 FL (ref 5–10.5)
POTASSIUM SERPL-SCNC: 4.3 MMOL/L (ref 3.5–5.1)
RBC # BLD AUTO: 3.66 M/UL (ref 4.2–5.9)
SODIUM SERPL-SCNC: 132 MMOL/L (ref 136–145)
WBC # BLD AUTO: 3.6 K/UL (ref 4–11)

## 2023-04-11 PROCEDURE — 6370000000 HC RX 637 (ALT 250 FOR IP): Performed by: STUDENT IN AN ORGANIZED HEALTH CARE EDUCATION/TRAINING PROGRAM

## 2023-04-11 PROCEDURE — C1894 INTRO/SHEATH, NON-LASER: HCPCS

## 2023-04-11 PROCEDURE — 94760 N-INVAS EAR/PLS OXIMETRY 1: CPT

## 2023-04-11 PROCEDURE — 75716 ARTERY X-RAYS ARMS/LEGS: CPT

## 2023-04-11 PROCEDURE — 93458 L HRT ARTERY/VENTRICLE ANGIO: CPT | Performed by: STUDENT IN AN ORGANIZED HEALTH CARE EDUCATION/TRAINING PROGRAM

## 2023-04-11 PROCEDURE — 2580000003 HC RX 258

## 2023-04-11 PROCEDURE — 6360000002 HC RX W HCPCS: Performed by: STUDENT IN AN ORGANIZED HEALTH CARE EDUCATION/TRAINING PROGRAM

## 2023-04-11 PROCEDURE — C1887 CATHETER, GUIDING: HCPCS

## 2023-04-11 PROCEDURE — 93458 L HRT ARTERY/VENTRICLE ANGIO: CPT

## 2023-04-11 PROCEDURE — 1200000000 HC SEMI PRIVATE

## 2023-04-11 PROCEDURE — B41F1ZZ FLUOROSCOPY OF RIGHT LOWER EXTREMITY ARTERIES USING LOW OSMOLAR CONTRAST: ICD-10-PCS | Performed by: STUDENT IN AN ORGANIZED HEALTH CARE EDUCATION/TRAINING PROGRAM

## 2023-04-11 PROCEDURE — 75625 CONTRAST EXAM ABDOMINL AORTA: CPT

## 2023-04-11 PROCEDURE — 83735 ASSAY OF MAGNESIUM: CPT

## 2023-04-11 PROCEDURE — 2709999900 HC NON-CHARGEABLE SUPPLY

## 2023-04-11 PROCEDURE — 4A023N7 MEASUREMENT OF CARDIAC SAMPLING AND PRESSURE, LEFT HEART, PERCUTANEOUS APPROACH: ICD-10-PCS | Performed by: STUDENT IN AN ORGANIZED HEALTH CARE EDUCATION/TRAINING PROGRAM

## 2023-04-11 PROCEDURE — 6360000002 HC RX W HCPCS: Performed by: EMERGENCY MEDICINE

## 2023-04-11 PROCEDURE — 93571 IV DOP VEL&/PRESS C FLO 1ST: CPT | Performed by: STUDENT IN AN ORGANIZED HEALTH CARE EDUCATION/TRAINING PROGRAM

## 2023-04-11 PROCEDURE — B41G1ZZ FLUOROSCOPY OF LEFT LOWER EXTREMITY ARTERIES USING LOW OSMOLAR CONTRAST: ICD-10-PCS | Performed by: STUDENT IN AN ORGANIZED HEALTH CARE EDUCATION/TRAINING PROGRAM

## 2023-04-11 PROCEDURE — 6360000002 HC RX W HCPCS

## 2023-04-11 PROCEDURE — 99152 MOD SED SAME PHYS/QHP 5/>YRS: CPT | Performed by: STUDENT IN AN ORGANIZED HEALTH CARE EDUCATION/TRAINING PROGRAM

## 2023-04-11 PROCEDURE — 6370000000 HC RX 637 (ALT 250 FOR IP)

## 2023-04-11 PROCEDURE — 99152 MOD SED SAME PHYS/QHP 5/>YRS: CPT

## 2023-04-11 PROCEDURE — 6370000000 HC RX 637 (ALT 250 FOR IP): Performed by: FAMILY MEDICINE

## 2023-04-11 PROCEDURE — C1769 GUIDE WIRE: HCPCS

## 2023-04-11 PROCEDURE — 85520 HEPARIN ASSAY: CPT

## 2023-04-11 PROCEDURE — 85025 COMPLETE CBC W/AUTO DIFF WBC: CPT

## 2023-04-11 PROCEDURE — 2500000003 HC RX 250 WO HCPCS

## 2023-04-11 PROCEDURE — 4A033BC MEASUREMENT OF ARTERIAL PRESSURE, CORONARY, PERCUTANEOUS APPROACH: ICD-10-PCS | Performed by: STUDENT IN AN ORGANIZED HEALTH CARE EDUCATION/TRAINING PROGRAM

## 2023-04-11 PROCEDURE — B2111ZZ FLUOROSCOPY OF MULTIPLE CORONARY ARTERIES USING LOW OSMOLAR CONTRAST: ICD-10-PCS | Performed by: STUDENT IN AN ORGANIZED HEALTH CARE EDUCATION/TRAINING PROGRAM

## 2023-04-11 PROCEDURE — 99153 MOD SED SAME PHYS/QHP EA: CPT

## 2023-04-11 PROCEDURE — 93571 IV DOP VEL&/PRESS C FLO 1ST: CPT

## 2023-04-11 PROCEDURE — 80069 RENAL FUNCTION PANEL: CPT

## 2023-04-11 PROCEDURE — 2580000003 HC RX 258: Performed by: STUDENT IN AN ORGANIZED HEALTH CARE EDUCATION/TRAINING PROGRAM

## 2023-04-11 PROCEDURE — 6360000004 HC RX CONTRAST MEDICATION: Performed by: INTERNAL MEDICINE

## 2023-04-11 PROCEDURE — 36415 COLL VENOUS BLD VENIPUNCTURE: CPT

## 2023-04-11 RX ORDER — CLOPIDOGREL BISULFATE 75 MG/1
75 TABLET ORAL DAILY
Status: DISCONTINUED | OUTPATIENT
Start: 2023-04-12 | End: 2023-04-14 | Stop reason: HOSPADM

## 2023-04-11 RX ORDER — CLOPIDOGREL BISULFATE 75 MG/1
300 TABLET ORAL ONCE
Status: COMPLETED | OUTPATIENT
Start: 2023-04-11 | End: 2023-04-11

## 2023-04-11 RX ADMIN — ATORVASTATIN CALCIUM 80 MG: 80 TABLET, FILM COATED ORAL at 19:38

## 2023-04-11 RX ADMIN — SODIUM CHLORIDE, PRESERVATIVE FREE 10 ML: 5 INJECTION INTRAVENOUS at 19:39

## 2023-04-11 RX ADMIN — IOPAMIDOL 135 ML: 755 INJECTION, SOLUTION INTRAVENOUS at 10:14

## 2023-04-11 RX ADMIN — CLOPIDOGREL BISULFATE 300 MG: 75 TABLET ORAL at 15:26

## 2023-04-11 RX ADMIN — SODIUM CHLORIDE, PRESERVATIVE FREE 10 ML: 5 INJECTION INTRAVENOUS at 13:40

## 2023-04-11 RX ADMIN — AZITHROMYCIN DIHYDRATE 500 MG: 500 INJECTION, POWDER, LYOPHILIZED, FOR SOLUTION INTRAVENOUS at 06:41

## 2023-04-11 RX ADMIN — HEPARIN SODIUM 880 UNITS/HR: 10000 INJECTION, SOLUTION INTRAVENOUS at 07:22

## 2023-04-11 NOTE — PRE SEDATION
gtt  Additional Medication Information:  as above      Pre-Sedation Documentation and Exam:   I have personally completed a history, physical exam & review of systems for this patient (see notes).     Mallampati Airway Assessment:  Mallampati Class II - (soft palate, fauces & uvula are visible)    Prior History of Anesthesia Complications:   none    ASA Classification:  Class 2 - A normal healthy patient with mild systemic disease    Sedation/ Anesthesia Plan:   intravenous sedation    Medications Planned:   midazolam (Versed) intravenously and fentanyl intravenously    Patient is an appropriate candidate for plan of sedation: yes    Electronically signed by Angel Domingo MD on 4/11/2023 at 2:02 PM

## 2023-04-11 NOTE — CARE COORDINATION
4/11 Plan: Return to home with wife. CTA runoff of the abdomen on 4/12. Follow for needs.  Electronically signed by Carol Hsu RN on 4/11/2023 at 4:56 PM

## 2023-04-11 NOTE — PLAN OF CARE
Problem: Safety - Adult  Goal: Free from fall injury  4/10/2023 2316 by Porter Woods RN  Outcome: Progressing  4/10/2023 1154 by Jenny Dai RN  Outcome: Progressing

## 2023-04-11 NOTE — PROCEDURES
of his R iliac artery  4. Will order a CTA runoff of the abdomen for tomorrow for vessel sizing prior to intervention 4/13  5.  Start Plavix 75mg Daily with 300mg Load today    Bon Menchaca MD  Interventional Cardiology  4/11/2023  2:03 PM    [address] [place] [address], [address] [place] [address]   [address]  Ph: [phone]  Fax: [phone]

## 2023-04-12 ENCOUNTER — APPOINTMENT (OUTPATIENT)
Dept: CT IMAGING | Age: 58
DRG: 181 | End: 2023-04-12
Payer: COMMERCIAL

## 2023-04-12 PROBLEM — R93.89 ABNORMAL CT OF THE CHEST: Status: ACTIVE | Noted: 2023-04-12

## 2023-04-12 PROBLEM — I73.9 PAD (PERIPHERAL ARTERY DISEASE) (HCC): Status: ACTIVE | Noted: 2023-04-12

## 2023-04-12 PROBLEM — C34.12 MALIGNANT NEOPLASM OF UPPER LOBE OF LEFT LUNG (HCC): Status: ACTIVE | Noted: 2023-04-12

## 2023-04-12 LAB
ALBUMIN SERPL-MCNC: 2.4 G/DL (ref 3.4–5)
ANION GAP SERPL CALCULATED.3IONS-SCNC: 10 MMOL/L (ref 3–16)
ANION GAP SERPL CALCULATED.3IONS-SCNC: 13 MMOL/L (ref 3–16)
BASOPHILS # BLD: 0 K/UL (ref 0–0.2)
BASOPHILS NFR BLD: 0.6 %
BUN SERPL-MCNC: 10 MG/DL (ref 7–20)
BUN SERPL-MCNC: 8 MG/DL (ref 7–20)
CALCIUM SERPL-MCNC: 7.9 MG/DL (ref 8.3–10.6)
CALCIUM SERPL-MCNC: 8 MG/DL (ref 8.3–10.6)
CHLORIDE SERPL-SCNC: 95 MMOL/L (ref 99–110)
CHLORIDE SERPL-SCNC: 98 MMOL/L (ref 99–110)
CO2 SERPL-SCNC: 19 MMOL/L (ref 21–32)
CO2 SERPL-SCNC: 22 MMOL/L (ref 21–32)
CREAT SERPL-MCNC: 0.7 MG/DL (ref 0.9–1.3)
CREAT SERPL-MCNC: 0.9 MG/DL (ref 0.9–1.3)
DEPRECATED RDW RBC AUTO: 16.1 % (ref 12.4–15.4)
DEPRECATED RDW RBC AUTO: 16.2 % (ref 12.4–15.4)
EOSINOPHIL # BLD: 0 K/UL (ref 0–0.6)
EOSINOPHIL NFR BLD: 0.3 %
GFR SERPLBLD CREATININE-BSD FMLA CKD-EPI: >60 ML/MIN/{1.73_M2}
GFR SERPLBLD CREATININE-BSD FMLA CKD-EPI: >60 ML/MIN/{1.73_M2}
GLUCOSE SERPL-MCNC: 153 MG/DL (ref 70–99)
GLUCOSE SERPL-MCNC: 82 MG/DL (ref 70–99)
HCT VFR BLD AUTO: 38.3 % (ref 40.5–52.5)
HCT VFR BLD AUTO: 40.3 % (ref 40.5–52.5)
HGB BLD-MCNC: 12.6 G/DL (ref 13.5–17.5)
HGB BLD-MCNC: 13.4 G/DL (ref 13.5–17.5)
LYMPHOCYTES # BLD: 0.4 K/UL (ref 1–5.1)
LYMPHOCYTES NFR BLD: 12.4 %
MAGNESIUM SERPL-MCNC: 1.6 MG/DL (ref 1.8–2.4)
MCH RBC QN AUTO: 28.9 PG (ref 26–34)
MCH RBC QN AUTO: 29.7 PG (ref 26–34)
MCHC RBC AUTO-ENTMCNC: 33 G/DL (ref 31–36)
MCHC RBC AUTO-ENTMCNC: 33.3 G/DL (ref 31–36)
MCV RBC AUTO: 87.4 FL (ref 80–100)
MCV RBC AUTO: 89.2 FL (ref 80–100)
MONOCYTES # BLD: 0.4 K/UL (ref 0–1.3)
MONOCYTES NFR BLD: 12.5 %
NEUTROPHILS # BLD: 2.6 K/UL (ref 1.7–7.7)
NEUTROPHILS NFR BLD: 74.2 %
PHOSPHATE SERPL-MCNC: 2.9 MG/DL (ref 2.5–4.9)
PLATELET # BLD AUTO: 199 K/UL (ref 135–450)
PLATELET # BLD AUTO: 201 K/UL (ref 135–450)
PMV BLD AUTO: 8 FL (ref 5–10.5)
PMV BLD AUTO: 8.2 FL (ref 5–10.5)
PNEUMONIA PANEL MOLECULAR: NORMAL
POTASSIUM SERPL-SCNC: 4.2 MMOL/L (ref 3.5–5.1)
POTASSIUM SERPL-SCNC: 4.3 MMOL/L (ref 3.5–5.1)
PROCALCITONIN SERPL IA-MCNC: 0.04 NG/ML (ref 0–0.15)
RBC # BLD AUTO: 4.38 M/UL (ref 4.2–5.9)
RBC # BLD AUTO: 4.52 M/UL (ref 4.2–5.9)
REPORT: NORMAL
SODIUM SERPL-SCNC: 127 MMOL/L (ref 136–145)
SODIUM SERPL-SCNC: 130 MMOL/L (ref 136–145)
WBC # BLD AUTO: 2.8 K/UL (ref 4–11)
WBC # BLD AUTO: 3.6 K/UL (ref 4–11)

## 2023-04-12 PROCEDURE — 87205 SMEAR GRAM STAIN: CPT

## 2023-04-12 PROCEDURE — 87449 NOS EACH ORGANISM AG IA: CPT

## 2023-04-12 PROCEDURE — 6370000000 HC RX 637 (ALT 250 FOR IP): Performed by: STUDENT IN AN ORGANIZED HEALTH CARE EDUCATION/TRAINING PROGRAM

## 2023-04-12 PROCEDURE — 80069 RENAL FUNCTION PANEL: CPT

## 2023-04-12 PROCEDURE — 84145 PROCALCITONIN (PCT): CPT

## 2023-04-12 PROCEDURE — 6370000000 HC RX 637 (ALT 250 FOR IP): Performed by: FAMILY MEDICINE

## 2023-04-12 PROCEDURE — 94640 AIRWAY INHALATION TREATMENT: CPT

## 2023-04-12 PROCEDURE — 2580000003 HC RX 258: Performed by: NURSE PRACTITIONER

## 2023-04-12 PROCEDURE — 83735 ASSAY OF MAGNESIUM: CPT

## 2023-04-12 PROCEDURE — 87070 CULTURE OTHR SPECIMN AEROBIC: CPT

## 2023-04-12 PROCEDURE — 99223 1ST HOSP IP/OBS HIGH 75: CPT | Performed by: INTERNAL MEDICINE

## 2023-04-12 PROCEDURE — 1200000000 HC SEMI PRIVATE

## 2023-04-12 PROCEDURE — 6370000000 HC RX 637 (ALT 250 FOR IP): Performed by: INTERNAL MEDICINE

## 2023-04-12 PROCEDURE — 94760 N-INVAS EAR/PLS OXIMETRY 1: CPT

## 2023-04-12 PROCEDURE — 6360000004 HC RX CONTRAST MEDICATION: Performed by: INTERNAL MEDICINE

## 2023-04-12 PROCEDURE — 99233 SBSQ HOSP IP/OBS HIGH 50: CPT | Performed by: NURSE PRACTITIONER

## 2023-04-12 PROCEDURE — 87633 RESP VIRUS 12-25 TARGETS: CPT

## 2023-04-12 PROCEDURE — 85027 COMPLETE CBC AUTOMATED: CPT

## 2023-04-12 PROCEDURE — 75635 CT ANGIO ABDOMINAL ARTERIES: CPT

## 2023-04-12 PROCEDURE — 36415 COLL VENOUS BLD VENIPUNCTURE: CPT

## 2023-04-12 PROCEDURE — 85025 COMPLETE CBC W/AUTO DIFF WBC: CPT

## 2023-04-12 RX ORDER — PREDNISONE 20 MG/1
40 TABLET ORAL DAILY
Status: DISCONTINUED | OUTPATIENT
Start: 2023-04-12 | End: 2023-04-14 | Stop reason: HOSPADM

## 2023-04-12 RX ORDER — SODIUM CHLORIDE 0.9 % (FLUSH) 0.9 %
5-40 SYRINGE (ML) INJECTION EVERY 12 HOURS SCHEDULED
Status: DISCONTINUED | OUTPATIENT
Start: 2023-04-12 | End: 2023-04-14 | Stop reason: SDUPTHER

## 2023-04-12 RX ORDER — SODIUM CHLORIDE 9 MG/ML
INJECTION, SOLUTION INTRAVENOUS PRN
Status: DISCONTINUED | OUTPATIENT
Start: 2023-04-12 | End: 2023-04-14 | Stop reason: SDUPTHER

## 2023-04-12 RX ORDER — SODIUM CHLORIDE 0.9 % (FLUSH) 0.9 %
5-40 SYRINGE (ML) INJECTION PRN
Status: DISCONTINUED | OUTPATIENT
Start: 2023-04-12 | End: 2023-04-14 | Stop reason: SDUPTHER

## 2023-04-12 RX ORDER — IPRATROPIUM BROMIDE AND ALBUTEROL SULFATE 2.5; .5 MG/3ML; MG/3ML
1 SOLUTION RESPIRATORY (INHALATION)
Status: DISCONTINUED | OUTPATIENT
Start: 2023-04-12 | End: 2023-04-14 | Stop reason: HOSPADM

## 2023-04-12 RX ORDER — IPRATROPIUM BROMIDE AND ALBUTEROL SULFATE 2.5; .5 MG/3ML; MG/3ML
1 SOLUTION RESPIRATORY (INHALATION)
Status: DISCONTINUED | OUTPATIENT
Start: 2023-04-12 | End: 2023-04-12

## 2023-04-12 RX ADMIN — IPRATROPIUM BROMIDE AND ALBUTEROL SULFATE 1 AMPULE: 2.5; .5 SOLUTION RESPIRATORY (INHALATION) at 15:16

## 2023-04-12 RX ADMIN — IPRATROPIUM BROMIDE AND ALBUTEROL SULFATE 1 AMPULE: 2.5; .5 SOLUTION RESPIRATORY (INHALATION) at 20:50

## 2023-04-12 RX ADMIN — IOPAMIDOL 75 ML: 755 INJECTION, SOLUTION INTRAVENOUS at 14:36

## 2023-04-12 RX ADMIN — IPRATROPIUM BROMIDE AND ALBUTEROL SULFATE 1 AMPULE: 2.5; .5 SOLUTION RESPIRATORY (INHALATION) at 12:14

## 2023-04-12 RX ADMIN — LEVOFLOXACIN 750 MG: 500 TABLET, FILM COATED ORAL at 12:02

## 2023-04-12 RX ADMIN — ATORVASTATIN CALCIUM 80 MG: 80 TABLET, FILM COATED ORAL at 20:11

## 2023-04-12 RX ADMIN — ASPIRIN 81 MG 81 MG: 81 TABLET ORAL at 09:46

## 2023-04-12 RX ADMIN — CLOPIDOGREL BISULFATE 75 MG: 75 TABLET ORAL at 09:46

## 2023-04-12 RX ADMIN — Medication 10 ML: at 20:12

## 2023-04-12 RX ADMIN — PREDNISONE 40 MG: 20 TABLET ORAL at 12:03

## 2023-04-12 NOTE — PLAN OF CARE
Problem: Discharge Planning  Goal: Discharge to home or other facility with appropriate resources  4/11/2023 2349 by Johny Self RN  Outcome: Progressing  4/11/2023 1348 by Alvaro Solis  Outcome: Progressing     Problem: Safety - Adult  Goal: Free from fall injury  4/11/2023 2349 by Johny Self RN  Outcome: Progressing  4/11/2023 1348 by Alvaro Solis  Outcome: Progressing     Problem: ABCDS Injury Assessment  Goal: Absence of physical injury  4/11/2023 2349 by Johny Self RN  Outcome: Progressing  4/11/2023 1348 by Alvaro Solis  Outcome: Progressing     Problem: Pain  Goal: Verbalizes/displays adequate comfort level or baseline comfort level  4/11/2023 2349 by Johny Self RN  Outcome: Progressing  4/11/2023 1348 by Alvaro Solis  Outcome: Progressing

## 2023-04-12 NOTE — CONSULTS
[facility]        Chief Complaint   Patient presents with    Chest Pain     Left sided chest pain started yesterday. Patient has a h/o GERD and has been taking his reflux medication without relief. History of Present Illness:  Nichelle Smalls is a 60 y.o. patient who presented to the hospital with complaints of chest pain. I have been asked to provide consultation regarding further management and testing. Patient reports to me he was walking around menards doing okay when he felt symptoms he attributed to indigestion with associated Nausea    Past Medical History:   has a past medical history of Active smoker, COPD (chronic obstructive pulmonary disease) ([facility] Hepatitis C, and Lung cancer ([facility] Surgical History:   has a past surgical history that includes Inguinal hernia repair (Right, 09/22/2017). Social History:   reports that he has been smoking cigarettes. He started smoking about 44 years ago. He has a 44.00 pack-year smoking history. He quit smokeless tobacco use about 25 years ago. His smokeless tobacco use included snuff. He reports current drug use. Drug: Marijuana [facility]). He reports that he does not drink alcohol. Family History:  No family history of premature coronary artery disease, aortic disease, or valve disease. Home Medications:  Were reviewed and are listed in nursing record. and/or listed below  Prior to Admission medications    Medication Sig Start Date End Date Taking?  Authorizing Provider   naproxen (NAPROSYN) 500 MG tablet Take 1 tablet by mouth 2 times daily (with meals) for 7 days 9/22/17 9/29/17  Arik Martinez MD        Current Medications:  Current Facility-Administered Medications   Medication Dose Route Frequency Provider Last Rate Last Admin    heparin 25,000 units in dextrose 5 % 250 mL infusion (rate based)  0-4,000 Units/hr IntraVENous Continuous Parminder Pendleton MD   Stopped at 04/10/23 0324    sodium chloride flush 0.9 %
Clinical Pharmacy Note  Heparin Dosing Consult    Betito Pendleton is a 60 y.o. male ordered heparin per CAD/STEMI/NSTEMI/UA/AFIB nomogram by Dr. Rafael Ovalle. Lab Results   Component Value Date/Time    APTT 26.8 04/10/2023 12:35 AM     Lab Results   Component Value Date/Time    HGB 13.9 04/10/2023 12:35 AM    HCT 41.3 04/10/2023 12:35 AM     04/10/2023 12:35 AM       Ht Readings from Last 1 Encounters:   04/10/23 5' 6\" (1.676 m)        Wt Readings from Last 1 Encounters:   04/10/23 138 lb 10.7 oz (62.9 kg)        Assessment/Plan:  Initial bolus: 3800 units  Initial infusion rate: 750 units/hr  Next anti-Xa: 0800 4/10/23    Pharmacy will continue to monitor adjust heparin based on anti-Xa results using nomogram below:     CAD/STEMI/NSTEMI/UA/AFIB Heparin Nomogram     Initial Bolus: 60 units/kg Max Bolus: 4,000 units       Initial Rate: 12 units/kg/hr Max Initial Rate: 1,000 units/hr     anti-Xa Bolus Titration   < 0.1 Heparin 60 units/kg bolus Increase drip by 4 units/kg/hr   0.1 - 0.29 Heparin 30 units/kg bolus Increase drip by 2 units/kg/hr   0.3 - 0.7 No Bolus No Change   0.71 - 0.8 No Bolus Decrease drip by 1 units/kg/hr   0.81 - 0.99 No Bolus Decrease drip by 2 units/kg/hr   > 1 Hold Heparin for 1 hour Decrease drip by 3 units/kg/hr     Obtain anti-Xa 6 hours after initial bolus and 6 hours after any dose change until two consecutive therapeutic anti-Xa levels are achieved - then daily.      Tami Courtney, PharmD
04/10/23  0035   APTT 26.8     UA:No results for input(s): NITRITE, COLORU, PHUR, LABCAST, WBCUA, RBCUA, MUCUS, TRICHOMONAS, YEAST, BACTERIA, CLARITYU, SPECGRAV, LEUKOCYTESUR, UROBILINOGEN, BILIRUBINUR, BLOODU, GLUCOSEU, AMORPHOUS in the last 72 hours. Invalid input(s): KETONESU  No results for input(s): PHART, QKR5QCU, PO2ART in the last 72 hours. Cultures:   None    PFTs: 2016     Mild obstruction    ECHO:   None    ABG:  None    Chest CT:  Chest imaging was reviewed by me and showed RUL airspace disease with air bronchograms. Mucus in airways. Severe emphysema. Left hilar scarring from previous cancer     I reviewed all the above labs and studies pertaining to this visit. ASSESSMENT/PLAN:  Abnormal CT Chest with right sided findings suggestive of pneumonia due to presence of air bronchograms  Check procal   Sputum for culture and pneumonia panel  Levaquin 750 mg for 5 days  Check antigens  Add Duonebs q 4 hours  Add Prednisone for 5 days   Obviously his plavix loading etc precludes biopsy at this time. May not need it regardless. Repeat imaging in 6-8 weeks to assess response to treatment   Emphysema  Add Duonebs  Add Prednisone   History of JULIA Lung Cancer treated with chemo and radiation. Stage IIIA.   Post-treatment changes noted in the JULIA  CAD/PAD  Tobacco Abuse   Nicotine patch     DVT prophylaxis  SCD      Sonny Koroma DO  [facility] Pulmonary

## 2023-04-13 ENCOUNTER — APPOINTMENT (OUTPATIENT)
Dept: CARDIAC CATH/INVASIVE PROCEDURES | Age: 58
DRG: 181 | End: 2023-04-13
Payer: COMMERCIAL

## 2023-04-13 PROBLEM — R07.9 CHEST PAIN: Status: ACTIVE | Noted: 2023-04-13

## 2023-04-13 LAB
CHOLEST SERPL-MCNC: 176 MG/DL (ref 0–199)
HDLC SERPL-MCNC: 34 MG/DL (ref 40–60)
LDLC SERPL CALC-MCNC: 120 MG/DL
LEGIONELLA AG UR QL: NORMAL
POC ACT LR: 302 SEC
S PNEUM AG UR QL: NORMAL
TRIGL SERPL-MCNC: 108 MG/DL (ref 0–150)
VLDLC SERPL CALC-MCNC: 22 MG/DL

## 2023-04-13 PROCEDURE — 85347 COAGULATION TIME ACTIVATED: CPT

## 2023-04-13 PROCEDURE — 75630 X-RAY AORTA LEG ARTERIES: CPT

## 2023-04-13 PROCEDURE — 6360000004 HC RX CONTRAST MEDICATION: Performed by: INTERNAL MEDICINE

## 2023-04-13 PROCEDURE — 6370000000 HC RX 637 (ALT 250 FOR IP): Performed by: INTERNAL MEDICINE

## 2023-04-13 PROCEDURE — B41C1ZZ FLUOROSCOPY OF PELVIC ARTERIES USING LOW OSMOLAR CONTRAST: ICD-10-PCS | Performed by: INTERNAL MEDICINE

## 2023-04-13 PROCEDURE — 2580000003 HC RX 258: Performed by: NURSE PRACTITIONER

## 2023-04-13 PROCEDURE — 1200000000 HC SEMI PRIVATE

## 2023-04-13 PROCEDURE — 75625 CONTRAST EXAM ABDOMINL AORTA: CPT | Performed by: INTERNAL MEDICINE

## 2023-04-13 PROCEDURE — C1725 CATH, TRANSLUMIN NON-LASER: HCPCS

## 2023-04-13 PROCEDURE — 6370000000 HC RX 637 (ALT 250 FOR IP): Performed by: FAMILY MEDICINE

## 2023-04-13 PROCEDURE — 047D3ZZ DILATION OF LEFT COMMON ILIAC ARTERY, PERCUTANEOUS APPROACH: ICD-10-PCS | Performed by: INTERNAL MEDICINE

## 2023-04-13 PROCEDURE — 36415 COLL VENOUS BLD VENIPUNCTURE: CPT

## 2023-04-13 PROCEDURE — 99152 MOD SED SAME PHYS/QHP 5/>YRS: CPT

## 2023-04-13 PROCEDURE — 04FD3ZZ FRAGMENTATION OF LEFT COMMON ILIAC ARTERY, PERCUTANEOUS APPROACH: ICD-10-PCS | Performed by: INTERNAL MEDICINE

## 2023-04-13 PROCEDURE — 36140 INTRO NDL ICATH UPR/LXTR ART: CPT

## 2023-04-13 PROCEDURE — C1760 CLOSURE DEV, VASC: HCPCS

## 2023-04-13 PROCEDURE — 6360000002 HC RX W HCPCS

## 2023-04-13 PROCEDURE — 99232 SBSQ HOSP IP/OBS MODERATE 35: CPT | Performed by: INTERNAL MEDICINE

## 2023-04-13 PROCEDURE — 6370000000 HC RX 637 (ALT 250 FOR IP): Performed by: STUDENT IN AN ORGANIZED HEALTH CARE EDUCATION/TRAINING PROGRAM

## 2023-04-13 PROCEDURE — C1894 INTRO/SHEATH, NON-LASER: HCPCS

## 2023-04-13 PROCEDURE — 37220 PR REVASCULARIZATION ILIAC ARTERY ANGIOP 1ST VSL: CPT | Performed by: INTERNAL MEDICINE

## 2023-04-13 PROCEDURE — B41D1ZZ FLUOROSCOPY OF AORTA AND BILATERAL LOWER EXTREMITY ARTERIES USING LOW OSMOLAR CONTRAST: ICD-10-PCS | Performed by: INTERNAL MEDICINE

## 2023-04-13 PROCEDURE — C1769 GUIDE WIRE: HCPCS

## 2023-04-13 PROCEDURE — 2580000003 HC RX 258

## 2023-04-13 PROCEDURE — 80061 LIPID PANEL: CPT

## 2023-04-13 PROCEDURE — C1887 CATHETER, GUIDING: HCPCS

## 2023-04-13 PROCEDURE — 94640 AIRWAY INHALATION TREATMENT: CPT

## 2023-04-13 PROCEDURE — 94760 N-INVAS EAR/PLS OXIMETRY 1: CPT

## 2023-04-13 PROCEDURE — 37220 HC ILIAC TERRITORY PLASTY: CPT

## 2023-04-13 PROCEDURE — 2500000003 HC RX 250 WO HCPCS

## 2023-04-13 PROCEDURE — 2709999900 HC NON-CHARGEABLE SUPPLY

## 2023-04-13 PROCEDURE — 99153 MOD SED SAME PHYS/QHP EA: CPT

## 2023-04-13 PROCEDURE — 99232 SBSQ HOSP IP/OBS MODERATE 35: CPT | Performed by: NURSE PRACTITIONER

## 2023-04-13 RX ADMIN — PREDNISONE 40 MG: 20 TABLET ORAL at 09:58

## 2023-04-13 RX ADMIN — IOPAMIDOL 70 ML: 755 INJECTION, SOLUTION INTRAVENOUS at 16:41

## 2023-04-13 RX ADMIN — IPRATROPIUM BROMIDE AND ALBUTEROL SULFATE 1 AMPULE: 2.5; .5 SOLUTION RESPIRATORY (INHALATION) at 08:08

## 2023-04-13 RX ADMIN — IPRATROPIUM BROMIDE AND ALBUTEROL SULFATE 1 AMPULE: 2.5; .5 SOLUTION RESPIRATORY (INHALATION) at 21:43

## 2023-04-13 RX ADMIN — LEVOFLOXACIN 750 MG: 500 TABLET, FILM COATED ORAL at 09:57

## 2023-04-13 RX ADMIN — ATORVASTATIN CALCIUM 80 MG: 80 TABLET, FILM COATED ORAL at 20:11

## 2023-04-13 RX ADMIN — ASPIRIN 81 MG 81 MG: 81 TABLET ORAL at 09:58

## 2023-04-13 RX ADMIN — Medication 10 ML: at 20:11

## 2023-04-13 RX ADMIN — CLOPIDOGREL BISULFATE 75 MG: 75 TABLET ORAL at 09:58

## 2023-04-13 RX ADMIN — IPRATROPIUM BROMIDE AND ALBUTEROL SULFATE 1 AMPULE: 2.5; .5 SOLUTION RESPIRATORY (INHALATION) at 11:57

## 2023-04-13 ASSESSMENT — PAIN SCALES - GENERAL: PAINLEVEL_OUTOF10: 0

## 2023-04-13 NOTE — CARE COORDINATION
4/13 Plan: Return to home with wife. [facility] today. Follow for needs.  Electronically signed by Jef Smiley RN on 4/13/2023 at 4:10 PM

## 2023-04-13 NOTE — PLAN OF CARE
Problem: Discharge Planning  Goal: Discharge to home or other facility with appropriate resources  4/13/2023 1034 by Mckenzie May RN  Outcome: Progressing  4/13/2023 1032 by Mckenzie May RN  Outcome: Progressing  4/13/2023 0139 by Vini Dupree RN  Outcome: Progressing     Problem: Safety - Adult  Goal: Free from fall injury  4/13/2023 1034 by Mckenzie May RN  Outcome: Progressing  4/13/2023 1032 by Mckenzie May RN  Outcome: Progressing  4/13/2023 0139 by Vini Dupree RN  Outcome: Progressing     Problem: ABCDS Injury Assessment  Goal: Absence of physical injury  4/13/2023 1034 by Mckenzie May RN  Outcome: Progressing  4/13/2023 1032 by Mckenzie May RN  Outcome: Progressing  4/13/2023 0139 by Vini Dupree RN  Outcome: Progressing     Problem: Pain  Goal: Verbalizes/displays adequate comfort level or baseline comfort level  4/13/2023 1034 by Mckenzie May RN  Outcome: Progressing  4/13/2023 1032 by Mckenzie May RN  Outcome: Progressing  4/13/2023 0139 by Vini Dupree RN  Outcome: Progressing

## 2023-04-14 ENCOUNTER — TELEPHONE (OUTPATIENT)
Dept: CARDIOLOGY CLINIC | Age: 58
End: 2023-04-14

## 2023-04-14 VITALS
OXYGEN SATURATION: 99 % | SYSTOLIC BLOOD PRESSURE: 112 MMHG | HEART RATE: 110 BPM | DIASTOLIC BLOOD PRESSURE: 71 MMHG | RESPIRATION RATE: 18 BRPM | BODY MASS INDEX: 18.28 KG/M2 | TEMPERATURE: 97.9 F | HEIGHT: 66 IN | WEIGHT: 113.76 LBS

## 2023-04-14 LAB
ANION GAP SERPL CALCULATED.3IONS-SCNC: 10 MMOL/L (ref 3–16)
BACTERIA SPEC RESP CULT: NORMAL
BASOPHILS # BLD: 0 K/UL (ref 0–0.2)
BASOPHILS NFR BLD: 0.1 %
BUN SERPL-MCNC: 19 MG/DL (ref 7–20)
CALCIUM SERPL-MCNC: 8.4 MG/DL (ref 8.3–10.6)
CHLORIDE SERPL-SCNC: 99 MMOL/L (ref 99–110)
CO2 SERPL-SCNC: 23 MMOL/L (ref 21–32)
CREAT SERPL-MCNC: 0.9 MG/DL (ref 0.9–1.3)
DEPRECATED RDW RBC AUTO: 16.5 % (ref 12.4–15.4)
EOSINOPHIL # BLD: 0 K/UL (ref 0–0.6)
EOSINOPHIL NFR BLD: 0 %
GFR SERPLBLD CREATININE-BSD FMLA CKD-EPI: >60 ML/MIN/{1.73_M2}
GLUCOSE SERPL-MCNC: 95 MG/DL (ref 70–99)
GRAM STN SPEC: NORMAL
HCT VFR BLD AUTO: 41.1 % (ref 40.5–52.5)
HGB BLD-MCNC: 14 G/DL (ref 13.5–17.5)
LYMPHOCYTES # BLD: 0.4 K/UL (ref 1–5.1)
LYMPHOCYTES NFR BLD: 7.9 %
MCH RBC QN AUTO: 29.8 PG (ref 26–34)
MCHC RBC AUTO-ENTMCNC: 34 G/DL (ref 31–36)
MCV RBC AUTO: 87.8 FL (ref 80–100)
MONOCYTES # BLD: 0.4 K/UL (ref 0–1.3)
MONOCYTES NFR BLD: 8.2 %
NEUTROPHILS # BLD: 4.4 K/UL (ref 1.7–7.7)
NEUTROPHILS NFR BLD: 83.8 %
PLATELET # BLD AUTO: 227 K/UL (ref 135–450)
PMV BLD AUTO: 8.1 FL (ref 5–10.5)
POTASSIUM SERPL-SCNC: 4.1 MMOL/L (ref 3.5–5.1)
RBC # BLD AUTO: 4.68 M/UL (ref 4.2–5.9)
SODIUM SERPL-SCNC: 132 MMOL/L (ref 136–145)
WBC # BLD AUTO: 5.3 K/UL (ref 4–11)

## 2023-04-14 PROCEDURE — 80048 BASIC METABOLIC PNL TOTAL CA: CPT

## 2023-04-14 PROCEDURE — 6370000000 HC RX 637 (ALT 250 FOR IP): Performed by: INTERNAL MEDICINE

## 2023-04-14 PROCEDURE — 6370000000 HC RX 637 (ALT 250 FOR IP): Performed by: STUDENT IN AN ORGANIZED HEALTH CARE EDUCATION/TRAINING PROGRAM

## 2023-04-14 PROCEDURE — 99232 SBSQ HOSP IP/OBS MODERATE 35: CPT | Performed by: INTERNAL MEDICINE

## 2023-04-14 PROCEDURE — 85025 COMPLETE CBC W/AUTO DIFF WBC: CPT

## 2023-04-14 PROCEDURE — 6370000000 HC RX 637 (ALT 250 FOR IP): Performed by: NURSE PRACTITIONER

## 2023-04-14 PROCEDURE — 94760 N-INVAS EAR/PLS OXIMETRY 1: CPT

## 2023-04-14 PROCEDURE — 99232 SBSQ HOSP IP/OBS MODERATE 35: CPT | Performed by: NURSE PRACTITIONER

## 2023-04-14 PROCEDURE — 36415 COLL VENOUS BLD VENIPUNCTURE: CPT

## 2023-04-14 PROCEDURE — 94640 AIRWAY INHALATION TREATMENT: CPT

## 2023-04-14 RX ORDER — LEVOFLOXACIN 750 MG/1
750 TABLET ORAL DAILY
Qty: 2 TABLET | Refills: 0 | Status: SHIPPED | OUTPATIENT
Start: 2023-04-15 | End: 2023-04-17

## 2023-04-14 RX ORDER — ASPIRIN 81 MG/1
81 TABLET, CHEWABLE ORAL DAILY
Qty: 30 TABLET | Refills: 3 | Status: SHIPPED | OUTPATIENT
Start: 2023-04-15

## 2023-04-14 RX ORDER — METOPROLOL SUCCINATE 25 MG/1
25 TABLET, EXTENDED RELEASE ORAL DAILY
Status: DISCONTINUED | OUTPATIENT
Start: 2023-04-14 | End: 2023-04-14 | Stop reason: HOSPADM

## 2023-04-14 RX ORDER — NITROGLYCERIN 0.4 MG/1
0.4 TABLET SUBLINGUAL EVERY 5 MIN PRN
Qty: 25 TABLET | Refills: 3 | Status: SHIPPED | OUTPATIENT
Start: 2023-04-14

## 2023-04-14 RX ORDER — ATORVASTATIN CALCIUM 80 MG/1
80 TABLET, FILM COATED ORAL NIGHTLY
Qty: 30 TABLET | Refills: 3 | Status: SHIPPED | OUTPATIENT
Start: 2023-04-14

## 2023-04-14 RX ORDER — METOPROLOL SUCCINATE 25 MG/1
25 TABLET, EXTENDED RELEASE ORAL DAILY
Qty: 30 TABLET | Refills: 3 | Status: SHIPPED | OUTPATIENT
Start: 2023-04-14

## 2023-04-14 RX ORDER — NICOTINE 21 MG/24HR
1 PATCH, TRANSDERMAL 24 HOURS TRANSDERMAL DAILY
Qty: 30 PATCH | Refills: 0 | Status: SHIPPED | OUTPATIENT
Start: 2023-04-15

## 2023-04-14 RX ORDER — CLOPIDOGREL BISULFATE 75 MG/1
75 TABLET ORAL DAILY
Qty: 30 TABLET | Refills: 3 | Status: SHIPPED | OUTPATIENT
Start: 2023-04-15

## 2023-04-14 RX ORDER — PREDNISONE 20 MG/1
40 TABLET ORAL DAILY
Qty: 4 TABLET | Refills: 0 | Status: SHIPPED | OUTPATIENT
Start: 2023-04-15 | End: 2023-04-17

## 2023-04-14 RX ADMIN — LEVOFLOXACIN 750 MG: 500 TABLET, FILM COATED ORAL at 09:28

## 2023-04-14 RX ADMIN — IPRATROPIUM BROMIDE AND ALBUTEROL SULFATE 1 AMPULE: 2.5; .5 SOLUTION RESPIRATORY (INHALATION) at 11:49

## 2023-04-14 RX ADMIN — PREDNISONE 40 MG: 20 TABLET ORAL at 09:29

## 2023-04-14 RX ADMIN — IPRATROPIUM BROMIDE AND ALBUTEROL SULFATE 1 AMPULE: 2.5; .5 SOLUTION RESPIRATORY (INHALATION) at 08:01

## 2023-04-14 RX ADMIN — ASPIRIN 81 MG 81 MG: 81 TABLET ORAL at 09:29

## 2023-04-14 RX ADMIN — CLOPIDOGREL BISULFATE 75 MG: 75 TABLET ORAL at 09:29

## 2023-04-14 RX ADMIN — METOPROLOL SUCCINATE 25 MG: 25 TABLET, EXTENDED RELEASE ORAL at 16:03

## 2023-04-14 NOTE — CARE COORDINATION
Case Management Discharge Note          Date / Time of Note: 4/14/2023 4:07 PM                  Patient Name: Mikle Ahumada   YOB: 1965  Diagnosis: NSTEMI (non-ST elevated myocardial infarction) [facility]) [I21.4]   Date / Time: 4/10/2023 12:20 AM    Financial:  Payor: Lore Hawthorne / Plan: Mirtha Berumen / Product Type: *No Product type* /      Pharmacy:    [address] [phone] - F [phone]  [id number]  [facility] [address]  Phone: [phone] Fax: [phone]      Assistance purchasing medications?: Potential Assistance Purchasing Medications: No  Assistance provided by Case Management: None at this time    DISCHARGE Disposition: Home- No Services Needed        Transportation:  Transportation PLAN for discharge: family   Mode of Transport: Private Car  Spouse to transport pt home    IMM Completed:   Not Indicated    Additional CM Notes: No d/c needs noted at this time.      The Plan for Transition of Care is related to the following treatment goals of NSTEMI (non-ST elevated myocardial infarction) [facility]) [I21.4]        Markel Patel [place] [address]   Case Management Department  ANN MARIE Mistry  [phone]  Electronically signed by BRITTON Sotelo on 4/14/2023 at 4:08 PM

## 2023-04-14 NOTE — PRE SEDATION
no  Antiplatelet drug therapy use last 7 days: yes   Other anticoagulant use last 7 days: no  Additional Medication Information:  n/a      Pre-Sedation Documentation and Exam:   I have personally completed a history, physical exam & review of systems for this patient (see notes).     Mallampati Airway Assessment:  Mallampati Class I - (soft palate, fauces, uvula & anterior/posterior tonsillar pillars are visible)    Prior History of Anesthesia Complications:   none    ASA Classification:  Class 3 - A patient with severe systemic disease that limits activity but is not incapacitating    Sedation/ Anesthesia Plan:   intravenous sedation    Medications Planned:   etomidate intravenously and midazolam (Versed) intravenously    Patient is an appropriate candidate for plan of sedation: yes    Electronically signed by Tan Redmond MD on 4/13/2023 at 10:44 PM

## 2023-04-14 NOTE — PROGRESS NOTES
4 Eyes Skin Assessment     NAME:  Una Cee  YOB: 1965  MEDICAL RECORD NUMBER:  [id number]    The patient is being assessed for  Admission    I agree that One RN has performed a thorough Head to Toe Skin Assessment on the patient. ALL assessment sites listed below have been assessed. Areas assessed by both nurses:    Head, Face, Ears, Shoulders, Back, Chest, Arms, Elbows, Hands, Sacrum. Buttock, Coccyx, Ischium, and Legs. Feet and Heels        Does the Patient have a Wound?  No noted wound(s)       Gregorio Prevention initiated by RN: No   Wound Care Orders initiated by RN: No    Pressure Injury (Stage 3,4, Unstageable, DTI, NWPT, and Complex wounds) if present, place referral order by RN under : No    New and Established Ostomies, if present place, referral order under : No      Nurse 1 eSignature: Electronically signed by Sade Sandoval RN on 4/10/23 at 5:18 AM EDT    **SHARE this note so that the co-signing nurse can place an eSignature**    Nurse 2 eSignature: Electronically signed by Linda Carmichael RN on 4/10/23 at 6:40 AM EDT
Clinical Pharmacy Note  Heparin Dosing       Lab Results   Component Value Date/Time    APTT 26.8 04/10/2023 12:35 AM     Lab Results   Component Value Date/Time    HGB 11.3 04/10/2023 07:15 PM    HCT 34.1 04/10/2023 07:15 PM     04/10/2023 07:15 PM       Current Infusion Rate: 880 units/hr    Plan:  Rate: continue at 880 units/hr  Next anti-Xa level: 4/11/23 1000    Pharmacy will continue to monitor and adjust based on anti-Xa results.   Gray Rondon RPH,4/11/2023,4:11 AM
Clinical Pharmacy Note  Heparin Dosing       Lab Results   Component Value Date/Time    APTT 26.8 04/10/2023 12:35 AM     Lab Results   Component Value Date/Time    HGB 11.5 04/10/2023 06:00 AM    HCT 34.1 04/10/2023 06:00 AM     04/10/2023 06:00 AM       Current Infusion Rate: 750 units/hr    Plan:  Bolus: 1900 units  Rate: increase to 880 units/hr  Next anti-Xa level: 4/10/23 2030    Pharmacy will continue to monitor and adjust based on anti-Xa results.   Magnolia Heaton [facility],4/10/2023,2:09 PM
Clinical Pharmacy Note  Heparin Dosing       Lab Results   Component Value Date/Time    APTT 26.8 04/10/2023 12:35 AM     Lab Results   Component Value Date/Time    HGB 13.9 04/10/2023 12:35 AM    HCT 41.3 04/10/2023 12:35 AM     04/10/2023 12:35 AM       Current Infusion Rate: 750 units/hr    Plan:  Rate: Continue at 750 units/hr  Next anti-Xa level: 1330 4/10/23    Pharmacy will continue to monitor and adjust based on anti-Xa results.    Eveline Jauregui [facility], PharmD, 4/10/2023 9:41 AM
Current rate: 880 units/hr  CAD/STEMI/NSTEMI/UA/AFIB heparin nomogram per pharmacy protocol to target anti-Xa 0.3 to 0.7 880
Hospitalist Progress Note      PCP: No primary care provider on file. Date of Admission: 4/10/2023        Hospital Course: pt admitted with chest pain and SOB, managed for unstable angina. Underwent cardiac cath today with recomm for staged intervention. Subjective: Pt seen and examined . No new complaints. No chest pain or SOB.       Medications:  Reviewed    Infusion Medications    sodium chloride Stopped (04/10/23 0617)    sodium chloride       Scheduled Medications    ipratropium-albuterol  1 ampule Inhalation Q4H While awake    predniSONE  40 mg Oral Daily    levoFLOXacin  750 mg Oral Daily    clopidogrel  75 mg Oral Daily    sodium chloride flush  5-40 mL IntraVENous 2 times per day    aspirin  81 mg Oral Daily    atorvastatin  80 mg Oral Nightly    nicotine  1 patch TransDERmal Daily    sodium chloride flush  5-40 mL IntraVENous 2 times per day     PRN Meds: sodium chloride flush, sodium chloride, acetaminophen **OR** acetaminophen, ondansetron, nitroGLYCERIN, morphine, sodium chloride flush, sodium chloride      Intake/Output Summary (Last 24 hours) at 4/12/2023 1056  Last data filed at 4/12/2023 0911  Gross per 24 hour   Intake 1040 ml   Output --   Net 1040 ml         Physical Exam Performed:    /78   Pulse 72   Temp 97.9 °F (36.6 °C)   Resp 16   Ht 5' 6\" (1.676 m)   Wt 118 lb 6.2 oz (53.7 kg)   SpO2 97%   BMI 19.11 kg/m²     Alert and oriented x3  Anicteric   CVS s1 and s2, rrr  Lungs CTA  Abdomen soft and non tender, right groin site with no swelling or hematoma   No edema         Labs:   Recent Labs     04/10/23  1915 04/11/23  0231 04/12/23  0629   WBC 3.7* 3.6* 3.6*   HGB 11.3* 10.9* 12.6*   HCT 34.1* 32.7* 38.3*    204 199       Recent Labs     04/10/23  0600 04/10/23  1915 04/11/23  0231 04/12/23  0629    130* 132* 130*   K 4.4 4.0 4.3 4.3    99 100 98*   CO2 21 21 23 19*   BUN 12 11 9 8   CREATININE 0.9 0.8* 0.8* 0.7*   CALCIUM 7.9* 7.9* 7.8* 7.9*   PHOS
Hospitalist Progress Note      PCP: No primary care provider on file. Date of Admission: 4/10/2023        Hospital Course: pt admitted with chest pain and SOB, managed for unstable angina. Underwent cardiac cath today with recomm for staged intervention. Subjective: pt seen and examined post cardiac cath, denies any current chest pain, SOB or nausea. Right groin access site clean, dry no hematoma. Medications:  Reviewed    Infusion Medications    heparin (PORCINE) Infusion 880 Units/hr (04/11/23 0722)    sodium chloride Stopped (04/10/23 0617)    sodium chloride       Scheduled Medications    sodium chloride flush  5-40 mL IntraVENous 2 times per day    aspirin  81 mg Oral Daily    atorvastatin  80 mg Oral Nightly    cefTRIAXone (ROCEPHIN) IV  1,000 mg IntraVENous Q24H    azithromycin  500 mg IntraVENous Q24H    nicotine  1 patch TransDERmal Daily    sodium chloride flush  5-40 mL IntraVENous 2 times per day     PRN Meds: sodium chloride flush, sodium chloride, acetaminophen **OR** acetaminophen, ondansetron, nitroGLYCERIN, morphine, sodium chloride flush, sodium chloride      Intake/Output Summary (Last 24 hours) at 4/11/2023 1343  Last data filed at 4/10/2023 2106  Gross per 24 hour   Intake 120 ml   Output --   Net 120 ml       Physical Exam Performed:    /67   Pulse 70   Temp 97.8 °F (36.6 °C) (Oral)   Resp 16   Ht 5' 6\" (1.676 m)   Wt 120 lb 5.9 oz (54.6 kg)   SpO2 97%   BMI 19.43 kg/m²     General appearance: No apparent distress, appears stated age and cooperative. HEENT: Pupils equal, round, and reactive to light. Conjunctivae/corneas clear. Respiratory:  Normal respiratory effort. Clear to auscultation, bilaterally without Rales/Wheezes/Rhonchi. Cardiovascular: Regular rate and rhythm with normal S1/S2   Abdomen: Soft, non-tender, non-distended   Musculoskeletal: No edema bilaterally. Full range of motion without deformity.   Neurologic:   grossly non-focal.  Psychiatric:
Patient hr above 120's. Provider maged notified.
Patient returned from cath lab to room [phone]. VSS. Right groin and left radial sites assessed. No bleeding, dressing clean, dry & intact.
Patient's Hgb/ Hct from 04/10/23 at 0015 was 13.2/41.3    Patien's most recent CBC shows Hgb/Hct of 11.5/ 34.1    Notified attending MD.    Electronically signed by Homar Byrne RN on 4/10/2023 at 10:11 AM
Pt dc home, iv removed, tele removed, personal belongings collected. Discharge instructions explained and pt verbalized understanding.
Pulmonary Progress Note    CC:  Follow up abnormal CT Chest    Subjective:  Procal WNL  Pneumonia panel WNL  Room air  He is very upset about his NPO order as it was entered incorrectly. Breathing is much improved with nebs and prednisone. Much less mucus       Intake/Output Summary (Last 24 hours) at 4/13/2023 0746  Last data filed at 4/12/2023 1434  Gross per 24 hour   Intake 600 ml   Output --   Net 600 ml         PHYSICAL EXAM:  Blood pressure 121/78, pulse 65, temperature 97.6 °F (36.4 °C), temperature source Oral, resp. rate 18, height 5' 6\" (1.676 m), weight 113 lb 1.5 oz (51.3 kg), SpO2 98 %.'  Gen: Chronically ill appearing   Eyes: PERRL. No sclera icterus. No conjunctival injection. ENT: No discharge. Pharynx clear. External appearance of ears and nose normal.  Neck: Trachea midline. No obvious mass. Resp: Clear with faint wheezing   CV: Regular rate. Regular rhythm. No murmur or rub. GI: Non-tender. Non-distended. No hernia. Skin: Warm, dry, normal texture and turgor. No nodule on exposed extremities. Lymph: No cervical LAD. No supraclavicular LAD. M/S: No cyanosis. No clubbing. No joint deformity. Neuro: Moves all four extremities. CN 2-12 tested, no defect noted.   Ext:   no edema    Medications:    Scheduled Meds:   predniSONE  40 mg Oral Daily    levoFLOXacin  750 mg Oral Daily    ipratropium-albuterol  1 ampule Inhalation Q4H WA    sodium chloride flush  5-40 mL IntraVENous 2 times per day    clopidogrel  75 mg Oral Daily    sodium chloride flush  5-40 mL IntraVENous 2 times per day    aspirin  81 mg Oral Daily    atorvastatin  80 mg Oral Nightly    nicotine  1 patch TransDERmal Daily    sodium chloride flush  5-40 mL IntraVENous 2 times per day       Continuous Infusions:   sodium chloride      sodium chloride Stopped (04/10/23 0617)    sodium chloride         PRN Meds:  sodium chloride flush, sodium chloride, sodium chloride flush, sodium chloride, acetaminophen **OR**
Pulmonary Progress Note    CC:  Follow up abnormal CT Chest    Subjective:  Room air  Better in terms of breathing     Intake/Output Summary (Last 24 hours) at 4/14/2023 0739  Last data filed at 4/13/2023 1936  Gross per 24 hour   Intake --   Output 300 ml   Net -300 ml           PHYSICAL EXAM:  Blood pressure 120/79, pulse 87, temperature 97.9 °F (36.6 °C), temperature source Oral, resp. rate 18, height 5' 6\" (1.676 m), weight 113 lb 12.1 oz (51.6 kg), SpO2 99 %.'  Gen: Chronically ill appearing   Eyes: PERRL. No sclera icterus. No conjunctival injection. ENT: No discharge. Pharynx clear. External appearance of ears and nose normal.  Neck: Trachea midline. No obvious mass. Resp: Clear with faint wheezing   CV: Regular rate. Regular rhythm. No murmur or rub. GI: Non-tender. Non-distended. No hernia. Skin: Warm, dry, normal texture and turgor. No nodule on exposed extremities. Lymph: No cervical LAD. No supraclavicular LAD. M/S: No cyanosis. ++ clubbing. No joint deformity. Neuro: Moves all four extremities. CN 2-12 tested, no defect noted.   Ext:   no edema    Medications:    Scheduled Meds:   predniSONE  40 mg Oral Daily    levoFLOXacin  750 mg Oral Daily    ipratropium-albuterol  1 ampule Inhalation Q4H WA    sodium chloride flush  5-40 mL IntraVENous 2 times per day    clopidogrel  75 mg Oral Daily    sodium chloride flush  5-40 mL IntraVENous 2 times per day    aspirin  81 mg Oral Daily    atorvastatin  80 mg Oral Nightly    nicotine  1 patch TransDERmal Daily    sodium chloride flush  5-40 mL IntraVENous 2 times per day       Continuous Infusions:   sodium chloride      sodium chloride Stopped (04/10/23 0617)    sodium chloride         PRN Meds:  sodium chloride flush, sodium chloride, sodium chloride flush, sodium chloride, acetaminophen **OR** acetaminophen, ondansetron, nitroGLYCERIN, morphine, sodium chloride flush, sodium chloride    Labs:  CBC:   Recent Labs     04/12/23  0647
[facility] that the doctor discontinued the pt heparin drip. Pt is alert and oriented x4 and had a left heart cath today. Order for the heparin drip is still in the [facility]. Notified Zach Whitt NP. Ok to discontinue heparin drip. Pt started on Plavix today.
04/12/23  0629 04/12/23 2016   * 130* 127*   K 4.3 4.3 4.2    98* 95*   CO2 23 19* 22   BUN 9 8 10   CREATININE 0.8* 0.7* 0.9   CALCIUM 7.8* 7.9* 8.0*   PHOS 3.1 2.9  --        No results for input(s): AST, ALT, BILIDIR, BILITOT, ALKPHOS in the last 72 hours. No results for input(s): INR in the last 72 hours. No results for input(s): Chester Mckinney in the last 72 hours. Urinalysis:      Lab Results   Component Value Date/Time    NITRU Negative 08/08/2014 09:44 AM    WBCUA 0-2 08/08/2014 09:44 AM    BACTERIA 1+ 08/08/2014 09:44 AM    RBCUA 0-2 08/08/2014 09:44 AM    BLOODU SMALL 08/08/2014 09:44 AM    SPECGRAV >=1.030 08/08/2014 09:44 AM    GLUCOSEU Negative 08/08/2014 09:44 AM       Radiology:  CTA ABDOMINAL AORTA W BILAT RUNOFF W CONTRAST   Final Result   1. Advanced atherosclerosis with mild dilation of the infrarenal abdominal   aorta measuring up to 2.8 cm. 5 year follow-up is recommended. There is   also 50-75% stenosis of the abdominal aorta distally. 2. Inflow disease to the lower extremities with occlusion of the right common   iliac artery with reconstituted flow in the external iliac artery and   approximately 80% stenosis proximally in the left common iliac artery. 3. No hemodynamically significant stenosis involving the bilateral femoral or   popliteal arteries. There is 2 vessel runoff bilaterally with no distal flow   involving the right anterior tibial artery and the left peroneal artery. 4. Trace bilateral pleural and trace pericardial effusions. 5. Emphysema. 6. Normal patency of the mesenteric and renal arteries. 7. Small amount of ascites in the pelvis.       RECOMMENDATIONS:   AAA RECOMMENDATION      2.6-2.9 CM: EVERY 5 YEARS      3.0-3.4 CM: EVERY 3 YEARS      3.5-3.9 CM: EVERY 12 MONTHS      4.0-4.4 CM: EVERY 12 MONTHS, VASCULAR CONSULTATION RECOMMENDED      4.5-5.4 CM: EVERY 6 MONTHS, VASCULAR CONSULTATION RECOMMENED      >5.5 CM: REFERRAL TO VASCULAR
acetaminophen **OR** acetaminophen, ondansetron, nitroGLYCERIN, morphine  Continuous Infusions:   sodium chloride Stopped (04/10/23 0617)       Intake/Output Summary (Last 24 hours) at 4/14/2023 1316  Last data filed at 4/13/2023 1936  Gross per 24 hour   Intake --   Output 300 ml   Net -300 ml       Lab Results   Component Value Date    ALT 12 04/10/2023    AST 23 04/10/2023    ALKPHOS 79 04/10/2023    BILITOT <0.2 04/10/2023     Lab Results   Component Value Date    CREATININE 0.9 04/14/2023    BUN 19 04/14/2023     (L) 04/14/2023    K 4.1 04/14/2023    CL 99 04/14/2023    CO2 23 04/14/2023     No results found for: TSH, Y0LCJXF, B5UIBSW, THYROIDAB  Lab Results   Component Value Date    WBC 5.3 04/14/2023    HGB 14.0 04/14/2023    HCT 41.1 04/14/2023    MCV 87.8 04/14/2023     04/14/2023     No components found for: CHLPL  Lab Results   Component Value Date    TRIG 108 04/13/2023    TRIG 113 08/16/2017     Lab Results   Component Value Date    HDL 34 (L) 04/13/2023    HDL 53 08/16/2017     Lab Results   Component Value Date    LDLCALC 120 (H) 04/13/2023    LDLCALC 115 (H) 08/16/2017     Lab Results   Component Value Date    LABVLDL 22 04/13/2023    LABVLDL 23 08/16/2017         -----------------------------------------------------------------  Telemetry: personally reviewed   SR     LVEDP 12 mmHg     ANGIOGRAM/CORONARY ARTERIOGRAM:        The left main coronary artery is angiographically free of disease. The left anterior descending artery has a mid vessel 70-80% stenosis which was evaluated by DFR and found to be positive with an average value of 0.88. D1 has mild diffuse disease     The left circumflex artery heavily calcified with a 95% mid vessel stenosis. OM1 has mild diffuse disease     The right coronary artery is dominant vessel with proximal 40% stenosis.               RPDA has minor luminal irregularities     R common iliac has a 100% occlusion  L common iliac
chloride Stopped (04/10/23 0617)    sodium chloride       PRN Meds:.sodium chloride flush, sodium chloride, sodium chloride flush, sodium chloride, acetaminophen **OR** acetaminophen, ondansetron, nitroGLYCERIN, morphine, sodium chloride flush, sodium chloride  Continuous Infusions:   sodium chloride      sodium chloride Stopped (04/10/23 0617)    sodium chloride         Intake/Output Summary (Last 24 hours) at 4/13/2023 1412  Last data filed at 4/12/2023 1434  Gross per 24 hour   Intake 200 ml   Output --   Net 200 ml       Lab Results   Component Value Date    ALT 12 04/10/2023    AST 23 04/10/2023    ALKPHOS 79 04/10/2023    BILITOT <0.2 04/10/2023     Lab Results   Component Value Date    CREATININE 0.9 04/12/2023    BUN 10 04/12/2023     (L) 04/12/2023    K 4.2 04/12/2023    CL 95 (L) 04/12/2023    CO2 22 04/12/2023     No results found for: TSH, S3OWFBJ, H9IFMRP, THYROIDAB  Lab Results   Component Value Date    WBC 2.8 (L) 04/12/2023    HGB 13.4 (L) 04/12/2023    HCT 40.3 (L) 04/12/2023    MCV 89.2 04/12/2023     04/12/2023     No components found for: CHLPL  Lab Results   Component Value Date    TRIG 108 04/13/2023    TRIG 113 08/16/2017     Lab Results   Component Value Date    HDL 34 (L) 04/13/2023    HDL 53 08/16/2017     Lab Results   Component Value Date    LDLCALC 120 (H) 04/13/2023    LDLCALC 115 (H) 08/16/2017     Lab Results   Component Value Date    LABVLDL 22 04/13/2023    LABVLDL 23 08/16/2017         -----------------------------------------------------------------  Telemetry: personally reviewed   SR     LVEDP 12 mmHg     ANGIOGRAM/CORONARY ARTERIOGRAM:        The left main coronary artery is angiographically free of disease. The left anterior descending artery has a mid vessel 70-80% stenosis which was evaluated by DFR and found to be positive with an average value of 0.88.               D1 has mild diffuse disease     The left circumflex artery heavily calcified with a 95% mid
catheter: XB 3.5  Comet II DFR wire advanced to the LAD and measurements obtained     RECOMMENDATION:       1. Staged intervention of his L iliac stenosis and LAD will be performed 4/13  2. Plan for outpatient rotational atherectomy of his Lcx  3. Plan for further outpatient intervention of his  of his R iliac artery  4. Will order a CTA runoff of the abdomen for tomorrow for vessel sizing prior to intervention 4/13  5. Start Plavix 75mg Daily with 300mg Load today       Objective:   Vitals: /75   Pulse 63   Temp 97.8 °F (36.6 °C) (Oral)   Resp 16   Ht 5' 6\" (1.676 m)   Wt 118 lb 6.2 oz (53.7 kg)   SpO2 98%   BMI 19.11 kg/m²   General appearance: alert, appears stated age and cooperative, No acute distress   Skin: Skin color, texture, turgor normal. No rashes or ecchymosis. HEENT: Head: Normal, normocephalic, atraumatic. Neck: no JVD, supple, symmetrical, trachea midline, and thyroid not enlarged, symmetric, no tenderness/mass/nodules  Lungs: clear to auscultation bilaterally, no accessory muscle use, no respiratory distress, on RA  Heart: regular rate and rhythm, S1, S2 normal, no murmur, click, rub or gallop  Abdomen: soft, non-tender; bowel sounds normal; no masses,  no organomegaly  Extremities: extremities normal, atraumatic, no cyanosis or edema, pulses: DP +1/+1,   Neurologic: Mental status: Alert, oriented, thought content appropriate, no tremors, no gross sensory motor deficit,   Psychiatric: normal insight and affect      Assessment & Plan:    Patient Active Problem List:          Plan:  1. NSTEMI     Plan for staged procedure   [facility] 4[facility]     Risks, benefits and alternatives of procedure discussed with patient. Patient verbalized understanding of possibility of transfer to another facility with CTS back-up if necessary.     NPO after midnight    Atherectomy of L Cx as outpt    No chest pain     On plavix-loaded 4/11/23   On ASA, statin   No beta-blocker secondary to baseline

## 2023-04-14 NOTE — CARE COORDINATION
4/14 Patient very upset. States that he is ready to sign out AMA. States he has been waiting for Doctor to see him all day. Dr. Angel Landry notified via [employer]. Electronically signed by Jeronimo Gallagher RN on 4/14/2023 at 3:24 PM

## 2023-04-14 NOTE — DISCHARGE SUMMARY
Hospital Medicine Discharge Summary    Patient ID: Clayton Mew      Patient's PCP: No primary care provider on file. Admit Date: 4/10/2023     Discharge Date:   4-14-23    Admitting Physician: Maryuri Quiros DO     Discharge Physician: Royal Valle MD     Discharge Diagnoses: Active Hospital Problems    Diagnosis Date Noted    Chest pain [R07.9] 04/13/2023    Abnormal CT of the chest [R93.89] 04/12/2023    [facility] [C34.12] 04/12/2023    PAD (peripheral artery disease) [facility] [I73.9] 04/12/2023    History of lung cancer in adulthood [Z85.118] 04/11/2023    NSTEMI (non-ST elevated myocardial infarction) [facility] [I21.4] 04/10/2023    Tobacco abuse [Z72.0] 08/30/2017    Panlobular emphysema ([facility] [J43.1]     Pneumonia [J18.9] 08/08/2014       The patient was seen and examined on day of discharge and this discharge summary is in conjunction with any daily progress note from day of discharge. Hospital Course: Patient was a history of malignant lung cancer status posttreatment, tobacco abuse/dependence, emphysema, presented to the emergency room with complaints of chest pain. Managed for unstable angina. Seen in house by cardiology and underwent cardiac catheterization on 4/11/2023 with findings pertinent for coronary artery disease. Underwent staged procedure on 4/13/2023 again with cardiology, shockwave lithotripsy of the left, left iliac artery occlusion. Cardiology plans to follow-up with him as an outpatient for intervention/PCI of LAD and left circumflex. Continue aspirin Plavix and statin postdischarge. On admission also managed for RUL PNA, complete antibiotic regimen post discharge. He is medically stable for discharge .        Exam:     /71   Pulse 96   Temp 97.9 °F (36.6 °C) (Oral)   Resp 18   Ht 5' 6\" (1.676 m)   Wt 113 lb 12.1 oz (51.6 kg)   SpO2 99%   BMI 18.36 kg/m²     General appearance: No apparent distress,

## 2023-04-14 NOTE — OP NOTE
[facility]     Pre-operative Diagnosis:   1. Peripheral arterial disease with claudication; baldomero category 3    Post-operative Diagnosis:   1. Same     Procedures Performed:  -Left radial artery access  -  bialteral   femoral artery access under fluroscopic and ultrasound guidance  -  bilateral   iliofemoral arteriogram.  - Aortoiliac arteriogram.  - Left lower extremity arteriogram  - Percutaneous balloon angioplasty of left iliac artery using a 9.0 mm x 40 mm shockwave lithotripsy balloon   - Completion arteriogram.      :  Ciro Hallman MD.    Assistant:  None . Anesthesia:  IV sedation and local anesthesia. Estimated Blood Loss:  10 mL. Indications for Procedure:  Benja Alfredo is a 60 y.o. male who was evaluated as an inpatient with chronic arterial insufficiency with claudication of the bilateral legs  and was deemed an appropriate candidate for an angiogram and possible intervention. Informed consent was obtained after discussion of potential benefits, alternatives and risks of the procedure, including but not limited to bleeding, infection, worsening of arterial insufficiency, and kidney injury due to contrast administration. Details of Procedure: The patient was taken to the cardiac cath lab and placed in supine position. IV sedation anesthesia was administered by the anesthesia team. The operative area was clipped, prepared and draped in sterile fashion. A brief time out was performed per hospital protocol. The  bilateral   femoral artery was accessed under fluroscopic and ultrasound guidance with a micropuncture needle, wire, then sheath. A 4-Turkish sheath was inserted over a wire. An iliofemoral angiogram was performed. Using a pig tail catheter positioned at the level of the renal arteries, a aortoiliac arteriogram was performed. Renal arteries: patent  Abdominal Aorta:  severe calcification.        Right Common Iliac:  100%  Stenosis  Right

## 2023-04-14 NOTE — PLAN OF CARE
Problem: Discharge Planning  Goal: Discharge to home or other facility with appropriate resources  4/13/2023 2019 by Buzz Cuenca RN  Outcome: Progressing  Flowsheets (Taken 4/13/2023 2011)  Discharge to home or other facility with appropriate resources: Identify barriers to discharge with patient and caregiver  4/13/2023 1034 by Virginia Duvall RN  Outcome: Progressing  4/13/2023 1032 by Virginia Duvall RN  Outcome: Progressing     Problem: Safety - Adult  Goal: Free from fall injury  4/13/2023 2019 by Buzz Cuenca RN  Outcome: Progressing  4/13/2023 1034 by Virginia Duvall RN  Outcome: Progressing  4/13/2023 1032 by Virginia Duvall RN  Outcome: Progressing     Problem: ABCDS Injury Assessment  Goal: Absence of physical injury  4/13/2023 2019 by Buzz Cuenca RN  Outcome: Progressing  4/13/2023 1034 by Virginia Duvall RN  Outcome: Progressing  4/13/2023 1032 by Virginia Duvall RN  Outcome: Progressing     Problem: Pain  Goal: Verbalizes/displays adequate comfort level or baseline comfort level  4/13/2023 2019 by Buzz Cuenca RN  Outcome: Progressing  4/13/2023 1034 by Virginia Duvall RN  Outcome: Progressing  4/13/2023 1032 by Virginia Duvall RN  Outcome: Progressing

## 2023-04-17 ENCOUNTER — TELEPHONE (OUTPATIENT)
Dept: PULMONOLOGY | Age: 58
End: 2023-04-17

## 2023-04-17 NOTE — TELEPHONE ENCOUNTER
Yajaira Mosley DO  [facility] [address] follow up in 6 weeks       Attempted to call pt mobile and wasn't his name. Home phone states voicemail not set up.

## 2023-04-18 NOTE — TELEPHONE ENCOUNTER
Patient will be scheduled for procedure after f/u with Dr. Luis Vivar which is scheduled for 5/10/23.

## 2023-05-10 ENCOUNTER — TELEPHONE (OUTPATIENT)
Dept: CARDIOLOGY CLINIC | Age: 58
End: 2023-05-10

## 2023-05-10 ENCOUNTER — OFFICE VISIT (OUTPATIENT)
Dept: CARDIOLOGY CLINIC | Age: 58
End: 2023-05-10
Payer: COMMERCIAL

## 2023-05-10 VITALS
HEIGHT: 66 IN | SYSTOLIC BLOOD PRESSURE: 106 MMHG | BODY MASS INDEX: 19.61 KG/M2 | OXYGEN SATURATION: 100 % | DIASTOLIC BLOOD PRESSURE: 72 MMHG | HEART RATE: 61 BPM | WEIGHT: 122 LBS

## 2023-05-10 DIAGNOSIS — F17.210 CIGARETTE NICOTINE DEPENDENCE WITHOUT COMPLICATION: ICD-10-CM

## 2023-05-10 DIAGNOSIS — I73.9 PAD (PERIPHERAL ARTERY DISEASE) (HCC): ICD-10-CM

## 2023-05-10 DIAGNOSIS — I25.10 CAD IN NATIVE ARTERY: ICD-10-CM

## 2023-05-10 DIAGNOSIS — E78.5 HYPERLIPIDEMIA LDL GOAL <70: ICD-10-CM

## 2023-05-10 DIAGNOSIS — I21.4 NSTEMI (NON-ST ELEVATED MYOCARDIAL INFARCTION) (HCC): Primary | ICD-10-CM

## 2023-05-10 PROCEDURE — G8427 DOCREV CUR MEDS BY ELIG CLIN: HCPCS | Performed by: STUDENT IN AN ORGANIZED HEALTH CARE EDUCATION/TRAINING PROGRAM

## 2023-05-10 PROCEDURE — 4004F PT TOBACCO SCREEN RCVD TLK: CPT | Performed by: STUDENT IN AN ORGANIZED HEALTH CARE EDUCATION/TRAINING PROGRAM

## 2023-05-10 PROCEDURE — 1111F DSCHRG MED/CURRENT MED MERGE: CPT | Performed by: STUDENT IN AN ORGANIZED HEALTH CARE EDUCATION/TRAINING PROGRAM

## 2023-05-10 PROCEDURE — 99214 OFFICE O/P EST MOD 30 MIN: CPT | Performed by: STUDENT IN AN ORGANIZED HEALTH CARE EDUCATION/TRAINING PROGRAM

## 2023-05-10 PROCEDURE — 3017F COLORECTAL CA SCREEN DOC REV: CPT | Performed by: STUDENT IN AN ORGANIZED HEALTH CARE EDUCATION/TRAINING PROGRAM

## 2023-05-10 PROCEDURE — G8420 CALC BMI NORM PARAMETERS: HCPCS | Performed by: STUDENT IN AN ORGANIZED HEALTH CARE EDUCATION/TRAINING PROGRAM

## 2023-06-05 ENCOUNTER — HOSPITAL ENCOUNTER (OUTPATIENT)
Dept: CARDIAC CATH/INVASIVE PROCEDURES | Age: 58
Discharge: HOME | End: 2023-06-05
Attending: STUDENT IN AN ORGANIZED HEALTH CARE EDUCATION/TRAINING PROGRAM | Admitting: STUDENT IN AN ORGANIZED HEALTH CARE EDUCATION/TRAINING PROGRAM
Payer: COMMERCIAL

## 2023-06-05 VITALS
RESPIRATION RATE: 16 BRPM | DIASTOLIC BLOOD PRESSURE: 72 MMHG | HEIGHT: 66 IN | TEMPERATURE: 98.4 F | HEART RATE: 47 BPM | SYSTOLIC BLOOD PRESSURE: 129 MMHG | WEIGHT: 122 LBS | BODY MASS INDEX: 19.61 KG/M2

## 2023-06-05 DIAGNOSIS — I25.83 CORONARY ARTERY DISEASE DUE TO LIPID RICH PLAQUE: Primary | ICD-10-CM

## 2023-06-05 DIAGNOSIS — I25.10 CORONARY ARTERY DISEASE DUE TO LIPID RICH PLAQUE: Primary | ICD-10-CM

## 2023-06-05 LAB
EKG ATRIAL RATE: 44 BPM
EKG ATRIAL RATE: 47 BPM
EKG DIAGNOSIS: NORMAL
EKG DIAGNOSIS: NORMAL
EKG P AXIS: 72 DEGREES
EKG P AXIS: 77 DEGREES
EKG P-R INTERVAL: 168 MS
EKG P-R INTERVAL: 168 MS
EKG Q-T INTERVAL: 430 MS
EKG Q-T INTERVAL: 460 MS
EKG QRS DURATION: 74 MS
EKG QRS DURATION: 80 MS
EKG QTC CALCULATION (BAZETT): 380 MS
EKG QTC CALCULATION (BAZETT): 393 MS
EKG R AXIS: 3 DEGREES
EKG R AXIS: 30 DEGREES
EKG T AXIS: 22 DEGREES
EKG T AXIS: 39 DEGREES
EKG VENTRICULAR RATE: 44 BPM
EKG VENTRICULAR RATE: 47 BPM
POC ACT LR: 320 SEC
POC ACT LR: >400 SEC

## 2023-06-05 PROCEDURE — C1725 CATH, TRANSLUMIN NON-LASER: HCPCS

## 2023-06-05 PROCEDURE — C1874 STENT, COATED/COV W/DEL SYS: HCPCS

## 2023-06-05 PROCEDURE — C1894 INTRO/SHEATH, NON-LASER: HCPCS

## 2023-06-05 PROCEDURE — 6360000002 HC RX W HCPCS

## 2023-06-05 PROCEDURE — 85347 COAGULATION TIME ACTIVATED: CPT

## 2023-06-05 PROCEDURE — 92978 ENDOLUMINL IVUS OCT C 1ST: CPT

## 2023-06-05 PROCEDURE — 99153 MOD SED SAME PHYS/QHP EA: CPT

## 2023-06-05 PROCEDURE — 93005 ELECTROCARDIOGRAM TRACING: CPT | Performed by: STUDENT IN AN ORGANIZED HEALTH CARE EDUCATION/TRAINING PROGRAM

## 2023-06-05 PROCEDURE — 2500000003 HC RX 250 WO HCPCS

## 2023-06-05 PROCEDURE — 92928 PRQ TCAT PLMT NTRAC ST 1 LES: CPT

## 2023-06-05 PROCEDURE — C1753 CATH, INTRAVAS ULTRASOUND: HCPCS

## 2023-06-05 PROCEDURE — 99152 MOD SED SAME PHYS/QHP 5/>YRS: CPT

## 2023-06-05 PROCEDURE — 93010 ELECTROCARDIOGRAM REPORT: CPT | Performed by: INTERNAL MEDICINE

## 2023-06-05 PROCEDURE — 6370000000 HC RX 637 (ALT 250 FOR IP)

## 2023-06-05 PROCEDURE — C1887 CATHETER, GUIDING: HCPCS

## 2023-06-05 PROCEDURE — 6360000004 HC RX CONTRAST MEDICATION: Performed by: STUDENT IN AN ORGANIZED HEALTH CARE EDUCATION/TRAINING PROGRAM

## 2023-06-05 PROCEDURE — C1769 GUIDE WIRE: HCPCS

## 2023-06-05 PROCEDURE — C1760 CLOSURE DEV, VASC: HCPCS

## 2023-06-05 RX ORDER — SODIUM CHLORIDE 0.9 % (FLUSH) 0.9 %
5-40 SYRINGE (ML) INJECTION PRN
Status: DISCONTINUED | OUTPATIENT
Start: 2023-06-05 | End: 2023-06-05 | Stop reason: HOSPADM

## 2023-06-05 RX ORDER — ACETAMINOPHEN 325 MG/1
650 TABLET ORAL EVERY 4 HOURS PRN
Status: DISCONTINUED | OUTPATIENT
Start: 2023-06-05 | End: 2023-06-05 | Stop reason: HOSPADM

## 2023-06-05 RX ORDER — SODIUM CHLORIDE 0.9 % (FLUSH) 0.9 %
5-40 SYRINGE (ML) INJECTION EVERY 12 HOURS SCHEDULED
Status: DISCONTINUED | OUTPATIENT
Start: 2023-06-05 | End: 2023-06-05 | Stop reason: HOSPADM

## 2023-06-05 RX ORDER — SODIUM CHLORIDE 9 MG/ML
INJECTION, SOLUTION INTRAVENOUS PRN
Status: DISCONTINUED | OUTPATIENT
Start: 2023-06-05 | End: 2023-06-05 | Stop reason: HOSPADM

## 2023-06-05 RX ADMIN — IOPAMIDOL 123 ML: 755 INJECTION, SOLUTION INTRAVENOUS at 08:57

## 2023-06-05 NOTE — PROCEDURES
Cardiac Catheterization Operative Report    Ana Cristina Dias  [id number]  6/5/2023  Janeice Merlin, MD    Patient has a diagnosis of acute coronary syndrome, angina at rest. He was referred for cardiac catheterization to perform therapeutic intervention. Procedure Details  The risks, benefits, complications, treatment options, and expected outcomes were discussed with the patient. The patient and/or family concurred with the proposed plan, giving informed consent. Patient was brought to the cath lab after IV hydration was begun and oral premedication was given. He was further sedated with fentanyl and versed. He was prepped and draped in the usual manner. Using the modified Seldinger access technique, a 6 [place] sheath was placed in the Left femoral artery. Heparin was administered. A left heart catheterization was done. Angiograms were also done. Moderate Sedation:  Start time: 0750  Stop time: 0856  4 mg versed   150 mcg fentanyl   An independent trained observer pushed medications at my direction. We monitored the patient's level of consciousness and vital signs/physiologic status throughout the procedure duration (see start and start times above). Interventions:  IVUS guided PCI of the LAD and LCx    After the procedure was completed. sedation was stopped and the sheaths and catheters were all removed. Hemostasis was achieved with manual pressure. Findings:    Hemodynamics  LVEDP not obtained   Left Main  No angiographically significant disease, mild fibrotic plaque <10% stenosis by IVUS, MLA > 10mm2   LAD  Obstructive mid to proximal vessel disease 80% DFR positive on previous cath.   Evaluated by Ivus distal vessel approx 2.5mm proximal 3.25mm in diameter, MLA 3.19 mm2   Circ  Ostial to proximal vessel 90 and 95% stenosis   RCA  Not engaged       Interventions/Vessels  PCI of the LAD:   - pre angioplasty with a 2.5x25mm SC balloon  - Xience RASHAWN 2.03o80uz  - Xience RASHAWN 2.5x12mm  - Post dilation

## 2023-06-05 NOTE — PRE SEDATION
Brief Pre-Op Note/Sedation Assessment      Sarah Patch  1965  [id number]  7:16 AM    Planned Procedure: Cardiac Catheterization Procedure  Post Procedure Plan: Return to same level of care  Consent: I have discussed with the patient and/or the patient representative the indication, alternatives, and the possible risks and/or complications of the planned procedure and the anesthesia methods. The patient and/or patient representative appear to understand and agree to proceed. Chief Complaint:   Chest Pain/Pressure  Anginal Equivalent  NSTEMI  Dyspnea on Exertion  Dyspnea    Indications for Cath Procedure:  Presentation:  ACS > 24 hrs and Worsening Angina  2. Anginal Classification within 2 weeks:  CCS IV - Inability to perform any activity without angina or angina at rest, i.e., severe limitation  3. Angina Symptoms Assessment:  Typical Chest Pain  4. Heart Failure Class within last 2 weeks:  No symptoms  5. Cardiovascular Instability:  No    Prior Ischemic Workup/Eval:  Pre-Procedural Medications: Yes: Aspirin, Beta Blockers, and STATIN  2. Stress Test Completed? No    Does Patient need surgery? Cath Valve Surgery:  No    Pre-Procedure Medical History:  Vital Signs: There were no vitals taken for this visit.     Allergies:  No Known Allergies  Medications:    Current Facility-Administered Medications   Medication Dose Route Frequency Provider Last Rate Last Admin    sodium chloride flush 0.9 % injection 5-40 mL  5-40 mL IntraVENous PRN Shannen Brown MD           Past Medical History:    Past Medical History:   Diagnosis Date    Active smoker     COPD (chronic obstructive pulmonary disease) ([facility] [address]     Hepatitis C     Lung cancer ([facility] [address] 2016    chemo and radiation       Surgical History:    Past Surgical History:   Procedure Laterality Date    INGUINAL HERNIA REPAIR Right 09/22/2017             Pre-Sedation:  Pre-Sedation Documentation and Exam:  I have personally completed a history,

## 2023-06-05 NOTE — H&P
H&P Update    The patient is 60 y.o. male with a documented past medical history significant for COPD, lung CA treated with chemo and radiation and hepatitis C. He presented to [facility] 2023 with complaints of chest pain reported while walking around [place] when he felt indigestion associated with nausea. EKG with acute elevations but does have some non-specific ST-T changes. He ruled in for NSTEMI and taken to the cath lab for coronary angiogram resulting in 2 vessel disease of the LAD and Lcx. Also found to have occluded R iliac and L common iliac. Patient returns to the office for follow up. He is accompanied by family and states his L leg pain has significantly improved. He is having R leg pain mostly close to the groin area. For the first 2 weeks after hospitalization, he did not smoke any cigarettes. He was using the nicotine patches states he became \"bored\" and resumed smoking. He has resumed, but cut back. Between he and his wife, they smoke 1 pack daily. He reports compliance with his medication. Denies any chest pains or SOB at rest.     I have reviewed the history and physical and examined the patient and find no relevant changes. I have reviewed with the patient and/or family the risks, benefits, and alternatives to the procedure. Pre-sedation Assessment    Patient:  Rosario Fontenot   :   1965    Intended Procedure:   [facility]  Coronary angiogram  PCI  Rotational atherectomy  IVUS    There were no vitals filed for this visit. Nursing notes reviewed and agreed. Medications reviewed  Allergies: No Known Allergies      Pre-Procedure Assessment/Plan:  ASA 2 - Patient with mild systemic disease with no functional limitations    Mallampati Airway Assessment:  Mallampati Class II - (soft palate, fauces & uvula are visible)    Level of Sedation Plan: Moderate sedation    Post Procedure plan: Return to same level of care    Dayna Edmond MD  Interventional Cardiology  7:13 AM

## 2023-07-05 ENCOUNTER — TELEPHONE (OUTPATIENT)
Dept: CARDIOLOGY CLINIC | Age: 58
End: 2023-07-05

## 2023-07-05 DIAGNOSIS — Z01.818 PREOP TESTING: ICD-10-CM

## 2023-07-05 DIAGNOSIS — I73.9 PAD (PERIPHERAL ARTERY DISEASE) (HCC): Primary | ICD-10-CM

## 2023-07-05 NOTE — TELEPHONE ENCOUNTER
Please call patient to schedule procedure. You can contact his wife Minesh Mejia at [phone], thanks. Discussed with Dr. Saloni Sanders who states the patient will need to be scheduled for peripheral with Dr. Leandro Goodwin on a Wednesday at [facility]. Please schedule staged intervention of Right iliac. The morning of your procedure you will park in the hospital parking lot and report directly to the cath lab to check in.     Pre-Procedure Instructions   You will need to fast for at least 8 hours prior to procedure. No caffeine, gum or mints the morning of procedure. All other medications can be taken in the morning with sips of water. You will need to take 325 mg aspirin the morning of. If you are currently taking 81 mg please take 4 tablets that morning. Do not use any lotions, creams or perfume the morning of procedure. Pre-procedure lab work will need to be completed 5-7 days prior to procedure. Please have a responsible adult to drive you home after procedure. We advise you have someone stay with you for the first 24 hours for precautionary measures. Depending on your procedure you may require an overnight stay. Cath lab will provide you with all post procedure instructions. If you have any questions regarding the procedure itself or medications, please call [phone] and ask to speak with a nurse.

## 2023-07-05 NOTE — TELEPHONE ENCOUNTER
Suhas's wife called in wanting to know when she can schedule Suhas's next procedure for his right leg? She states it has been a month since his last procedure and he is wanting to proceed.     Latisha's callback: [phone]

## 2023-07-06 NOTE — TELEPHONE ENCOUNTER
Spoke with the wife and got the patient scheduled for procedure. She asked for a morning time as Wed would have been afternoon. I moved the procedure one day to get an AM spot. We went over instructions below and she verbalized understanding. Procedure - Periph Angio staged intervention of Right iliac. Date: 7/20/2023  Arrival time: 9:30 am   Procedure time: 11:00 am       The morning of your procedure you will park in the hospital parking lot and report directly to the cath lab to check in.      Pre-Procedure Instructions   You will need to fast for at least 8 hours prior to procedure. No caffeine, gum or mints the morning of procedure. All other medications can be taken in the morning with sips of water. You will need to take 325 mg aspirin the morning of. If you are currently taking 81 mg please take 4 tablets that morning. Do not use any lotions, creams or perfume the morning of procedure. Pre-procedure lab work will need to be completed 5-7 days prior to procedure. Please have a responsible adult to drive you home after procedure. We advise you have someone stay with you for the first 24 hours for precautionary measures. Depending on your procedure you may require an overnight stay. Cath lab will provide you with all post procedure instructions. If you have any questions regarding the procedure itself or medications, please call [phone] and ask to speak with a nurse.         Irlanda Monroe updated / emailed cath lab

## 2023-07-17 ENCOUNTER — TELEPHONE (OUTPATIENT)
Dept: CARDIOLOGY CLINIC | Age: 58
End: 2023-07-17

## 2023-07-17 NOTE — TELEPHONE ENCOUNTER
Patient is scheduled to have a staged intervention of the right iliac on 7/20 with Dr. Dannie Finn. This is a Dr. Cesar Muniz patient. They are offering the opportunity for a peer to peer review. Please call [phone] within 5 days of today. Refer to Case #[id number] when calling.   If [facility] is obtained, please provide

## 2023-07-19 NOTE — TELEPHONE ENCOUNTER
Peer to peer scheduled for 1 of  the following 3 dates/times:     7/20 9a - 11a  7/21 9a - 5p  7/24 9a - 5p    Patient can keep scheduled procedure for 7/20/23 at 11 AM.

## 2023-07-20 ENCOUNTER — HOSPITAL ENCOUNTER (OUTPATIENT)
Dept: CARDIAC CATH/INVASIVE PROCEDURES | Age: 58
Discharge: HOME | End: 2023-07-20
Payer: COMMERCIAL

## 2023-07-20 VITALS
HEART RATE: 59 BPM | HEIGHT: 66 IN | DIASTOLIC BLOOD PRESSURE: 76 MMHG | RESPIRATION RATE: 16 BRPM | OXYGEN SATURATION: 100 % | BODY MASS INDEX: 19.29 KG/M2 | WEIGHT: 120 LBS | SYSTOLIC BLOOD PRESSURE: 120 MMHG | TEMPERATURE: 97.2 F

## 2023-07-20 LAB
POC ACT LR: 212 SEC
POC ACT LR: 309 SEC

## 2023-07-20 PROCEDURE — C1894 INTRO/SHEATH, NON-LASER: HCPCS

## 2023-07-20 PROCEDURE — C1874 STENT, COATED/COV W/DEL SYS: HCPCS

## 2023-07-20 PROCEDURE — 2709999900 HC NON-CHARGEABLE SUPPLY

## 2023-07-20 PROCEDURE — C1769 GUIDE WIRE: HCPCS

## 2023-07-20 PROCEDURE — 37221 HC ILIAC TERRITORY PLASTY STENT: CPT

## 2023-07-20 PROCEDURE — C1725 CATH, TRANSLUMIN NON-LASER: HCPCS

## 2023-07-20 PROCEDURE — 6360000004 HC RX CONTRAST MEDICATION: Performed by: INTERNAL MEDICINE

## 2023-07-20 PROCEDURE — 75625 CONTRAST EXAM ABDOMINL AORTA: CPT

## 2023-07-20 PROCEDURE — 85347 COAGULATION TIME ACTIVATED: CPT

## 2023-07-20 PROCEDURE — 37224 HC FEM POP TERRITORY PLASTY: CPT

## 2023-07-20 PROCEDURE — 6360000002 HC RX W HCPCS

## 2023-07-20 PROCEDURE — 99153 MOD SED SAME PHYS/QHP EA: CPT

## 2023-07-20 PROCEDURE — C1887 CATHETER, GUIDING: HCPCS

## 2023-07-20 PROCEDURE — 99152 MOD SED SAME PHYS/QHP 5/>YRS: CPT

## 2023-07-20 PROCEDURE — 2500000003 HC RX 250 WO HCPCS

## 2023-07-20 PROCEDURE — 2580000003 HC RX 258

## 2023-07-20 RX ORDER — SODIUM CHLORIDE 0.9 % (FLUSH) 0.9 %
5-40 SYRINGE (ML) INJECTION EVERY 12 HOURS SCHEDULED
Status: DISCONTINUED | OUTPATIENT
Start: 2023-07-20 | End: 2023-07-21 | Stop reason: HOSPADM

## 2023-07-20 RX ORDER — SODIUM CHLORIDE 9 MG/ML
INJECTION, SOLUTION INTRAVENOUS PRN
Status: DISCONTINUED | OUTPATIENT
Start: 2023-07-20 | End: 2023-07-21 | Stop reason: HOSPADM

## 2023-07-20 RX ORDER — ACETAMINOPHEN 325 MG/1
650 TABLET ORAL EVERY 4 HOURS PRN
Status: DISCONTINUED | OUTPATIENT
Start: 2023-07-20 | End: 2023-07-21 | Stop reason: HOSPADM

## 2023-07-20 RX ORDER — SODIUM CHLORIDE 0.9 % (FLUSH) 0.9 %
5-40 SYRINGE (ML) INJECTION PRN
Status: DISCONTINUED | OUTPATIENT
Start: 2023-07-20 | End: 2023-07-21 | Stop reason: HOSPADM

## 2023-07-20 RX ADMIN — IOPAMIDOL 210 ML: 755 INJECTION, SOLUTION INTRAVENOUS at 13:04

## 2023-07-20 NOTE — DISCHARGE INSTRUCTIONS
PERIPHERAL ANGIOGRAM    Care of your puncture site:  Remove bandage 24 hours after the procedure. May shower in 24 hours but do not sit in a bathtub/pool of water for 5 days or until the wound is healed. Gently clean groin using soap and water. Dry thoroughly and apply a Band-Aid that covers the entire site. Use Band-Aid until skin heals over in about 3-5 days. Do not apply powder or lotion. Normal Observations:  Soreness or tenderness which may last one week. Mild oozing from the incision site. Possible bruising that could last 2 weeks. Activity:  You may resume driving 24 hours following the procedure. Do not make important / legal decisions within 24 hours after procedure. You may resume normal activity in 5 days or after the wound heals. Avoid lifting more than 10 pounds for 5 days or until the wound heals. Avoid strenuous exercise or activity for 1 week. You may return to work in 5 day(s), if applicable. Nutrition:  Regular diet or as directed by your doctor. Drink at least 8 to 10 glasses of decaffeinated, non-alcoholic fluid for the next 24 hours to flush the x-ray dye used for your angiogram out of your body. Call your doctor immediately if your condition worsens, for any other concerns, for a follow-up appointment or if you experience any of the following:  Increased swelling on the groin or leg. Unusual pain, numbness, or tingling of the groin or down the leg. Any signs of infection such as: redness, yellow drainage at the site, swelling or pain. IF GROIN STARTS BLEEDING SIGNIFICANTLY:   LAY FLAT, HOLD FIRM DIRECT PRESSURE, AND CALL 911    Other Instructions:  Hold Metformin or Metformin containing drugs for 48 hours after procedure if applicable.

## 2023-08-02 ENCOUNTER — OFFICE VISIT (OUTPATIENT)
Dept: CARDIOLOGY CLINIC | Age: 58
End: 2023-08-02

## 2023-08-02 VITALS
SYSTOLIC BLOOD PRESSURE: 116 MMHG | HEIGHT: 66 IN | BODY MASS INDEX: 19.61 KG/M2 | WEIGHT: 122 LBS | OXYGEN SATURATION: 95 % | HEART RATE: 56 BPM | DIASTOLIC BLOOD PRESSURE: 66 MMHG

## 2023-08-02 DIAGNOSIS — E78.5 HYPERLIPIDEMIA LDL GOAL <70: ICD-10-CM

## 2023-08-02 DIAGNOSIS — I73.9 PAD (PERIPHERAL ARTERY DISEASE) (HCC): ICD-10-CM

## 2023-08-02 DIAGNOSIS — F17.210 CIGARETTE NICOTINE DEPENDENCE WITHOUT COMPLICATION: ICD-10-CM

## 2023-08-02 DIAGNOSIS — I25.10 CAD IN NATIVE ARTERY: Primary | ICD-10-CM

## 2023-08-02 RX ORDER — CLOPIDOGREL BISULFATE 75 MG/1
75 TABLET ORAL DAILY
Qty: 90 TABLET | Refills: 3 | Status: SHIPPED | OUTPATIENT
Start: 2023-08-02

## 2023-08-02 RX ORDER — ASPIRIN 81 MG/1
81 TABLET, CHEWABLE ORAL DAILY
Qty: 90 TABLET | Refills: 3 | Status: SHIPPED | OUTPATIENT
Start: 2023-08-02

## 2023-08-02 RX ORDER — METOPROLOL SUCCINATE 25 MG/1
25 TABLET, EXTENDED RELEASE ORAL DAILY
Qty: 30 TABLET | Refills: 3 | Status: CANCELLED | OUTPATIENT
Start: 2023-08-02

## 2023-08-02 RX ORDER — ATORVASTATIN CALCIUM 80 MG/1
80 TABLET, FILM COATED ORAL NIGHTLY
Qty: 90 TABLET | Refills: 3 | Status: SHIPPED | OUTPATIENT
Start: 2023-08-02

## 2023-08-02 RX ORDER — NICOTINE 21 MG/24HR
1 PATCH, TRANSDERMAL 24 HOURS TRANSDERMAL DAILY
Qty: 42 PATCH | Refills: 0 | Status: SHIPPED | OUTPATIENT
Start: 2023-08-02 | End: 2023-09-13

## 2023-08-02 NOTE — PROGRESS NOTES
Guides/Wires  XB 3.5   [employer]   Post % Stenosis  0   Closure Device  Perclose   Complications  None      Conclusion:   - Successful 2V PCI with IVUS    Interventions:  IVUS guided PCI of the LAD and LCx    Peripheral 7/20/23  Procedures Performed:  -  bilateral   femoral artery access under fluroscopic and ultrasound guidance  -  bilateral   iliofemoral arteriogram.  - Right brachial artery access using US guidance   - Aortoiliac arteriogram.  - Percutaneous balloon angioplasty of  bilateral   common iliac   artery using a 10 mm x 40 mm shockwave lithotripsy balloon   - Percutaneous insertion of balloon expandable covered stent (10 mm x 38 mm) into  bilateral    common iliac  artery. - Completion arteriogram.    Impression/Plan:   100% occluded right common iliac artery and 95% stenosis left common iliac artery   S/p successful bilateral common iliac artery shockwave lithotrispy and covered stenting   Aggressive medical therapy for PAD   Supervised walking program     Assessment/Plan:     Coronary artery disease/NSTEMI. Two-vessel disease. Revascularization of Lcx and LAD. Continue DAPT. Patient reports \"dull\" chest pain that does not interfere with daily activities. Continue medical management except for Toprol XL due to reports light headedness. Will continue to monitor. Referral to cardiac rehab. Peripheral arterial disease with claudication; baldomero category 4. S/P successful shockwave lithotripsy of left common iliac artery 4/2023. S/p successful bilateral common iliac artery shockwave lithotripsy and covered stenting with Dr. Leonel Freed 7/20/23. Referral to cardiac rehab. Hyperlipidemia goal LDL <70. Continue statin therapy. Nicotine Addiction. Encouraged smoking cessation. Patient has cut back and now wearing nicotine patches. Will increase to 21mg nicotine patches for benefit of complete smoking cessation. Return to the office in 6 months.      Thank